# Patient Record
Sex: FEMALE | Race: WHITE | NOT HISPANIC OR LATINO | Employment: FULL TIME | URBAN - METROPOLITAN AREA
[De-identification: names, ages, dates, MRNs, and addresses within clinical notes are randomized per-mention and may not be internally consistent; named-entity substitution may affect disease eponyms.]

---

## 2018-10-29 ENCOUNTER — OFFICE VISIT - HEALTHEAST (OUTPATIENT)
Dept: FAMILY MEDICINE | Facility: CLINIC | Age: 28
End: 2018-10-29

## 2018-10-29 DIAGNOSIS — Z00.00 ROUTINE GENERAL MEDICAL EXAMINATION AT A HEALTH CARE FACILITY: ICD-10-CM

## 2018-10-29 DIAGNOSIS — Z86.19 HISTORY OF HERPES GENITALIS: ICD-10-CM

## 2018-10-29 DIAGNOSIS — J30.9 ALLERGIC RHINITIS: ICD-10-CM

## 2018-10-29 DIAGNOSIS — F41.1 GAD (GENERALIZED ANXIETY DISORDER): ICD-10-CM

## 2018-10-29 DIAGNOSIS — Z12.4 SCREENING FOR CERVICAL CANCER: ICD-10-CM

## 2018-10-29 ASSESSMENT — MIFFLIN-ST. JEOR: SCORE: 1299.77

## 2018-10-30 LAB
HPV SOURCE: NORMAL
HUMAN PAPILLOMA VIRUS 16 DNA: NEGATIVE
HUMAN PAPILLOMA VIRUS 18 DNA: NEGATIVE
HUMAN PAPILLOMA VIRUS FINAL DIAGNOSIS: NORMAL
HUMAN PAPILLOMA VIRUS OTHER HR: NEGATIVE
SPECIMEN DESCRIPTION: NORMAL

## 2018-11-05 ENCOUNTER — COMMUNICATION - HEALTHEAST (OUTPATIENT)
Dept: FAMILY MEDICINE | Facility: CLINIC | Age: 28
End: 2018-11-05

## 2018-11-05 LAB
BKR LAB AP ABNORMAL BLEEDING: NO
BKR LAB AP BIRTH CONTROL/HORMONES: NORMAL
BKR LAB AP CERVICAL APPEARANCE: NORMAL
BKR LAB AP GYN ADEQUACY: NORMAL
BKR LAB AP GYN INTERPRETATION: NORMAL
BKR LAB AP HPV REFLEX: NORMAL
BKR LAB AP LMP: NORMAL
BKR LAB AP PATIENT STATUS: NORMAL
BKR LAB AP PREVIOUS ABNORMAL: NORMAL
BKR LAB AP PREVIOUS NORMAL: 2015
HIGH RISK?: NO
PATH REPORT.COMMENTS IMP SPEC: NORMAL
RESULT FLAG (HE HISTORICAL CONVERSION): NORMAL

## 2018-11-07 ENCOUNTER — COMMUNICATION - HEALTHEAST (OUTPATIENT)
Dept: FAMILY MEDICINE | Facility: CLINIC | Age: 28
End: 2018-11-07

## 2018-11-07 DIAGNOSIS — F41.1 GAD (GENERALIZED ANXIETY DISORDER): ICD-10-CM

## 2018-11-07 DIAGNOSIS — M25.50 MULTIPLE JOINT PAIN: ICD-10-CM

## 2018-11-12 ENCOUNTER — OFFICE VISIT - HEALTHEAST (OUTPATIENT)
Dept: BEHAVIORAL HEALTH | Facility: CLINIC | Age: 28
End: 2018-11-12

## 2018-11-12 DIAGNOSIS — F41.1 GAD (GENERALIZED ANXIETY DISORDER): ICD-10-CM

## 2018-11-19 ENCOUNTER — AMBULATORY - HEALTHEAST (OUTPATIENT)
Dept: LAB | Facility: CLINIC | Age: 28
End: 2018-11-19

## 2018-11-19 ENCOUNTER — OFFICE VISIT - HEALTHEAST (OUTPATIENT)
Dept: BEHAVIORAL HEALTH | Facility: CLINIC | Age: 28
End: 2018-11-19

## 2018-11-19 DIAGNOSIS — M25.50 MULTIPLE JOINT PAIN: ICD-10-CM

## 2018-11-19 DIAGNOSIS — F41.1 GAD (GENERALIZED ANXIETY DISORDER): ICD-10-CM

## 2018-11-20 LAB — B BURGDOR IGG+IGM SER QL: 0.06 INDEX VALUE

## 2019-01-26 ENCOUNTER — COMMUNICATION - HEALTHEAST (OUTPATIENT)
Dept: FAMILY MEDICINE | Facility: CLINIC | Age: 29
End: 2019-01-26

## 2019-01-26 DIAGNOSIS — J30.9 ALLERGIC RHINITIS: ICD-10-CM

## 2019-01-26 DIAGNOSIS — F41.1 GAD (GENERALIZED ANXIETY DISORDER): ICD-10-CM

## 2019-02-07 ENCOUNTER — COMMUNICATION - HEALTHEAST (OUTPATIENT)
Dept: FAMILY MEDICINE | Facility: CLINIC | Age: 29
End: 2019-02-07

## 2019-02-07 DIAGNOSIS — A60.00 GENITAL HERPES SIMPLEX, UNSPECIFIED SITE: ICD-10-CM

## 2019-11-07 ENCOUNTER — OFFICE VISIT - HEALTHEAST (OUTPATIENT)
Dept: FAMILY MEDICINE | Facility: CLINIC | Age: 29
End: 2019-11-07

## 2019-11-07 DIAGNOSIS — Z11.3 SCREEN FOR STD (SEXUALLY TRANSMITTED DISEASE): ICD-10-CM

## 2019-11-07 DIAGNOSIS — N90.7 VULVAR CYST: ICD-10-CM

## 2019-11-07 DIAGNOSIS — Z13.21 ENCOUNTER FOR VITAMIN DEFICIENCY SCREENING: ICD-10-CM

## 2019-11-07 DIAGNOSIS — Z13.0 SCREENING FOR DEFICIENCY ANEMIA: ICD-10-CM

## 2019-11-07 DIAGNOSIS — Z13.1 SCREENING FOR DIABETES MELLITUS: ICD-10-CM

## 2019-11-07 DIAGNOSIS — Z13.220 SCREENING FOR CHOLESTEROL LEVEL: ICD-10-CM

## 2019-11-07 DIAGNOSIS — Z00.00 ROUTINE GENERAL MEDICAL EXAMINATION AT A HEALTH CARE FACILITY: ICD-10-CM

## 2019-11-07 DIAGNOSIS — R53.83 LOW ENERGY: ICD-10-CM

## 2019-11-07 DIAGNOSIS — Z86.19 HISTORY OF HERPES GENITALIS: ICD-10-CM

## 2019-11-07 DIAGNOSIS — F41.1 GAD (GENERALIZED ANXIETY DISORDER): ICD-10-CM

## 2019-11-07 DIAGNOSIS — R20.8 BURNING SENSATION OF MOUTH: ICD-10-CM

## 2019-11-07 LAB
ALBUMIN SERPL-MCNC: 4.3 G/DL (ref 3.5–5)
ALP SERPL-CCNC: 36 U/L (ref 45–120)
ALT SERPL W P-5'-P-CCNC: 14 U/L (ref 0–45)
ANION GAP SERPL CALCULATED.3IONS-SCNC: 9 MMOL/L (ref 5–18)
AST SERPL W P-5'-P-CCNC: 18 U/L (ref 0–40)
BILIRUB SERPL-MCNC: 0.7 MG/DL (ref 0–1)
BUN SERPL-MCNC: 10 MG/DL (ref 8–22)
CALCIUM SERPL-MCNC: 9.3 MG/DL (ref 8.5–10.5)
CHLORIDE BLD-SCNC: 106 MMOL/L (ref 98–107)
CHOLEST SERPL-MCNC: 196 MG/DL
CO2 SERPL-SCNC: 24 MMOL/L (ref 22–31)
CREAT SERPL-MCNC: 0.78 MG/DL (ref 0.6–1.1)
ERYTHROCYTE [DISTWIDTH] IN BLOOD BY AUTOMATED COUNT: 11.7 % (ref 11–14.5)
FASTING STATUS PATIENT QL REPORTED: YES
FERRITIN SERPL-MCNC: 38 NG/ML (ref 10–130)
FOLATE SERPL-MCNC: 13 NG/ML
GFR SERPL CREATININE-BSD FRML MDRD: >60 ML/MIN/1.73M2
GLUCOSE BLD-MCNC: 84 MG/DL (ref 70–125)
HCT VFR BLD AUTO: 42.4 % (ref 35–47)
HDLC SERPL-MCNC: 84 MG/DL
HGB BLD-MCNC: 14 G/DL (ref 12–16)
HIV 1+2 AB+HIV1 P24 AG SERPL QL IA: NEGATIVE
LDLC SERPL CALC-MCNC: 99 MG/DL
MCH RBC QN AUTO: 31.3 PG (ref 27–34)
MCHC RBC AUTO-ENTMCNC: 33 G/DL (ref 32–36)
MCV RBC AUTO: 95 FL (ref 80–100)
PLATELET # BLD AUTO: 258 THOU/UL (ref 140–440)
PMV BLD AUTO: 6.7 FL (ref 7–10)
POTASSIUM BLD-SCNC: 3.7 MMOL/L (ref 3.5–5)
PROT SERPL-MCNC: 6.8 G/DL (ref 6–8)
RBC # BLD AUTO: 4.47 MILL/UL (ref 3.8–5.4)
SODIUM SERPL-SCNC: 139 MMOL/L (ref 136–145)
TRIGL SERPL-MCNC: 63 MG/DL
TSH SERPL DL<=0.005 MIU/L-ACNC: 1.05 UIU/ML (ref 0.3–5)
VIT B12 SERPL-MCNC: 293 PG/ML (ref 213–816)
WBC: 5.4 THOU/UL (ref 4–11)

## 2019-11-07 RX ORDER — CETIRIZINE HYDROCHLORIDE 10 MG/1
10 TABLET, CHEWABLE ORAL DAILY
Status: SHIPPED | COMMUNITY
Start: 2019-11-07 | End: 2021-08-18

## 2019-11-07 ASSESSMENT — ANXIETY QUESTIONNAIRES
4. TROUBLE RELAXING: MORE THAN HALF THE DAYS
6. BECOMING EASILY ANNOYED OR IRRITABLE: SEVERAL DAYS
5. BEING SO RESTLESS THAT IT IS HARD TO SIT STILL: SEVERAL DAYS
GAD7 TOTAL SCORE: 14
3. WORRYING TOO MUCH ABOUT DIFFERENT THINGS: NEARLY EVERY DAY
2. NOT BEING ABLE TO STOP OR CONTROL WORRYING: MORE THAN HALF THE DAYS
1. FEELING NERVOUS, ANXIOUS, OR ON EDGE: NEARLY EVERY DAY
7. FEELING AFRAID AS IF SOMETHING AWFUL MIGHT HAPPEN: MORE THAN HALF THE DAYS
IF YOU CHECKED OFF ANY PROBLEMS ON THIS QUESTIONNAIRE, HOW DIFFICULT HAVE THESE PROBLEMS MADE IT FOR YOU TO DO YOUR WORK, TAKE CARE OF THINGS AT HOME, OR GET ALONG WITH OTHER PEOPLE: SOMEWHAT DIFFICULT

## 2019-11-07 ASSESSMENT — MIFFLIN-ST. JEOR: SCORE: 1294.1

## 2019-11-07 ASSESSMENT — PATIENT HEALTH QUESTIONNAIRE - PHQ9: SUM OF ALL RESPONSES TO PHQ QUESTIONS 1-9: 1

## 2019-11-08 LAB
25(OH)D3 SERPL-MCNC: 22.9 NG/ML (ref 30–80)
25(OH)D3 SERPL-MCNC: 22.9 NG/ML (ref 30–80)
C TRACH DNA SPEC QL PROBE+SIG AMP: NEGATIVE
HAV IGM SERPL QL IA: NEGATIVE
HBV CORE IGM SERPL QL IA: NEGATIVE
HBV SURFACE AG SERPL QL IA: NEGATIVE
HCV AB SERPL QL IA: NEGATIVE
N GONORRHOEA DNA SPEC QL NAA+PROBE: NEGATIVE
T PALLIDUM AB SER QL: NEGATIVE

## 2019-11-09 LAB — ZINC SERPL-MCNC: 68.1 UG/DL (ref 60–120)

## 2019-11-11 LAB
PYRIDOXAL PHOS SERPL-SCNC: 22.3 NMOL/L (ref 20–125)
VIT C SERPL-MCNC: 18 UMOL/L (ref 23–114)

## 2019-11-19 ENCOUNTER — COMMUNICATION - HEALTHEAST (OUTPATIENT)
Dept: FAMILY MEDICINE | Facility: CLINIC | Age: 29
End: 2019-11-19

## 2019-11-19 DIAGNOSIS — Z30.011 ENCOUNTER FOR INITIAL PRESCRIPTION OF CONTRACEPTIVE PILLS: ICD-10-CM

## 2019-11-22 ENCOUNTER — COMMUNICATION - HEALTHEAST (OUTPATIENT)
Dept: FAMILY MEDICINE | Facility: CLINIC | Age: 29
End: 2019-11-22

## 2020-01-11 ENCOUNTER — COMMUNICATION - HEALTHEAST (OUTPATIENT)
Dept: FAMILY MEDICINE | Facility: CLINIC | Age: 30
End: 2020-01-11

## 2020-03-20 ENCOUNTER — COMMUNICATION - HEALTHEAST (OUTPATIENT)
Dept: FAMILY MEDICINE | Facility: CLINIC | Age: 30
End: 2020-03-20

## 2020-03-30 ENCOUNTER — COMMUNICATION - HEALTHEAST (OUTPATIENT)
Dept: FAMILY MEDICINE | Facility: CLINIC | Age: 30
End: 2020-03-30

## 2020-03-30 DIAGNOSIS — J30.9 ALLERGIC RHINITIS: ICD-10-CM

## 2020-05-19 ENCOUNTER — COMMUNICATION - HEALTHEAST (OUTPATIENT)
Dept: FAMILY MEDICINE | Facility: CLINIC | Age: 30
End: 2020-05-19

## 2020-05-20 ENCOUNTER — COMMUNICATION - HEALTHEAST (OUTPATIENT)
Dept: FAMILY MEDICINE | Facility: CLINIC | Age: 30
End: 2020-05-20

## 2020-05-20 DIAGNOSIS — R20.8 BURNING SENSATION OF MOUTH: ICD-10-CM

## 2020-06-01 ENCOUNTER — OFFICE VISIT - HEALTHEAST (OUTPATIENT)
Dept: FAMILY MEDICINE | Facility: CLINIC | Age: 30
End: 2020-06-01

## 2020-06-01 DIAGNOSIS — N39.0 ACUTE UTI (URINARY TRACT INFECTION): ICD-10-CM

## 2020-06-02 ENCOUNTER — COMMUNICATION - HEALTHEAST (OUTPATIENT)
Dept: FAMILY MEDICINE | Facility: CLINIC | Age: 30
End: 2020-06-02

## 2020-06-02 DIAGNOSIS — N39.0 URINARY TRACT INFECTION WITHOUT HEMATURIA, SITE UNSPECIFIED: ICD-10-CM

## 2020-06-18 ENCOUNTER — COMMUNICATION - HEALTHEAST (OUTPATIENT)
Dept: FAMILY MEDICINE | Facility: CLINIC | Age: 30
End: 2020-06-18

## 2020-07-07 ENCOUNTER — COMMUNICATION - HEALTHEAST (OUTPATIENT)
Dept: FAMILY MEDICINE | Facility: CLINIC | Age: 30
End: 2020-07-07

## 2020-07-08 ENCOUNTER — OFFICE VISIT - HEALTHEAST (OUTPATIENT)
Dept: FAMILY MEDICINE | Facility: CLINIC | Age: 30
End: 2020-07-08

## 2020-07-08 DIAGNOSIS — R21 RASH AND NONSPECIFIC SKIN ERUPTION: ICD-10-CM

## 2020-07-08 DIAGNOSIS — L30.9 DERMATITIS: ICD-10-CM

## 2020-09-09 ENCOUNTER — COMMUNICATION - HEALTHEAST (OUTPATIENT)
Dept: FAMILY MEDICINE | Facility: CLINIC | Age: 30
End: 2020-09-09

## 2020-09-10 ENCOUNTER — COMMUNICATION - HEALTHEAST (OUTPATIENT)
Dept: FAMILY MEDICINE | Facility: CLINIC | Age: 30
End: 2020-09-10

## 2020-09-10 DIAGNOSIS — Z30.011 ENCOUNTER FOR INITIAL PRESCRIPTION OF CONTRACEPTIVE PILLS: ICD-10-CM

## 2020-09-11 ENCOUNTER — COMMUNICATION - HEALTHEAST (OUTPATIENT)
Dept: FAMILY MEDICINE | Facility: CLINIC | Age: 30
End: 2020-09-11

## 2020-09-11 DIAGNOSIS — Z30.011 ENCOUNTER FOR INITIAL PRESCRIPTION OF CONTRACEPTIVE PILLS: ICD-10-CM

## 2020-09-30 ENCOUNTER — RECORDS - HEALTHEAST (OUTPATIENT)
Dept: ADMINISTRATIVE | Facility: OTHER | Age: 30
End: 2020-09-30

## 2020-10-26 ENCOUNTER — VIRTUAL VISIT (OUTPATIENT)
Dept: FAMILY MEDICINE | Facility: OTHER | Age: 30
End: 2020-10-26

## 2020-10-26 NOTE — PROGRESS NOTES
"Date: 10/26/2020 12:12:30  Clinician: Hung Ordaz  Clinician NPI: [id number]  Patient: Harley Quarles  Patient : 1990  Patient Address: [address] [place] [address]  Patient Phone: [phone]  Visit Protocol: URI  Patient Summary:  Harley is a 29 year old ( : 1990 ) female who initiated a [facility] Visit for COVID-19 (Coronavirus) evaluation and screening. When asked the question \"Please sign me up to receive news, health information and promotions. \", Harley responded \"No\".    Harley states her symptoms started 1-2 days ago.   Her symptoms consist of ear pain, a headache, a cough, nasal congestion, anosmia, rhinitis, nausea, malaise, ageusia, and diarrhea. She is experiencing difficulty breathing due to nasal congestion but she is not short of breath.   Symptom details     Nasal secretions: The color of her mucus is clear.    Cough: Harley coughs a few times an hour and her cough is not more bothersome at night. Phlegm does not come into her throat when she coughs. She believes her cough is caused by post-nasal drip.     Headache: She states the headache is mild (1-3 on a 10 point pain scale).      Harley denies having wheezing, fever, vomiting, facial pain or pressure, myalgias, chills, sore throat, and teeth pain. She also denies taking antibiotic medication in the past month and having recent facial or sinus surgery in the past 60 days.   Precipitating events  She has not recently been exposed to someone with influenza. Harley has not been in close contact with any high risk individuals.   Pertinent COVID-19 (Coronavirus) information  In the past 14 days, Harley has not worked in a congregate living setting.   She does not work or volunteer as healthcare worker or a  and does not work or volunteer in a healthcare facility.   Harley also has not lived in a congregate living setting in the past 14 days. She does not live with a healthcare worker.   Harley has had a close contact " with a laboratory-confirmed COVID-19 patient within 14 days of symptom onset. Additional information about contact with COVID-19 (Coronavirus) patient as reported by the patient (free text): I was at a wedding this weekend with a lot of people who weren't wearing masks. I'm concerned I have been exposed and would like to be tested.   Since December 2019, Harley and has not had upper respiratory infection or influenza-like illness. Has not been diagnosed with lab-confirmed COVID-19 test   Pertinent medical history  Harley does not get yeast infections when she takes antibiotics.   Harley needs a return to work/school note.   Weight: 115 lbs   Harley does not smoke or use smokeless tobacco.   She denies pregnancy and denies breastfeeding. She has menstruated in the past month.   Weight: 115 lbs    MEDICATIONS: Apri oral, ALLERGIES: Penicillins  Clinician Response:  Dear Harley,   Your symptoms show that you may have coronavirus (COVID-19). This illness can cause fever, cough and trouble breathing. Many people get a mild case and get better on their own. Some people can get very sick.  What should I do?  We would like to test you for this virus.   1. Please call [phone] to schedule your visit. Explain that you were [facility] to have a COVID-19 test. Be ready to share your [facility] visit ID number.  Please note that if you are assessed for Covid-19 testing and receive an order for testing from [facility], that the scheduling of your Covid test at [facility] may be delayed by three or four days or more due to limited availability for testing. Additional options for testing can be found on the [place] Covid-19 Response website. https://[place].gov/covid19/    The following will serve as your written order for this COVID Test, ordered by me, for the indication of suspected COVID [Z20.828]: The test will be ordered in [employer], our electronic health record, after you are scheduled. It will show as ordered and authorized  "by Star Greenwood MD.  Order: COVID-19 (Coronavirus) PCR for SYMPTOMATIC testing from [facility].   2. When it's time for your COVID test:  Stay at least 6 feet away from others. (If someone will drive you to your test, stay in the backseat, as far away from the  as you can.)   Cover your mouth and nose with a mask, tissue or washcloth.  Go straight to the testing site. Don't make any stops on the way there or back.      3.Starting now: Stay home and away from others (self-isolate) until:   You've had no fever---and no medicine that reduces fever---for one full day (24 hours). And...   Your other symptoms have gotten better. For example, your cough or breathing has improved. And...   At least 10 days have passed since your symptoms started.       During this time, don't leave the house except for testing or medical care.   Stay in your own room, even for meals. Use your own bathroom if you can.   Stay away from others in your home. No hugging, kissing or shaking hands. No visitors.  Don't go to work, school or anywhere else.    Clean \"high touch\" surfaces often (doorknobs, counters, handles, etc.). Use a household cleaning spray or wipes. You'll find a full list of  on the EPA website: www.epa.gov/pesticide-registration/list-n-disinfectants-use-against-sars-cov-2.   Cover your mouth and nose with a mask, tissue or washcloth to avoid spreading germs.  Wash your hands and face often. Use soap and water.  Caregivers in these groups are at risk for severe illness due to COVID-19:  o People 65 years and older  o People who live in a nursing home or long-term care facility  o People with chronic disease (lung, heart, cancer, diabetes, kidney, liver, immunologic)  o People who have a weakened immune system, including those who:   Are in cancer treatment  Take medicine that weakens the immune system, such as corticosteroids  Had a bone marrow or organ transplant  Have an immune deficiency  Have poorly controlled HIV " or AIDS  Are obese (body mass index of 40 or higher)  Smoke regularly   o Caregivers should wear gloves while washing dishes, handling laundry and cleaning bedrooms and bathrooms.  o Use caution when washing and drying laundry: Don't shake dirty laundry, and use the warmest water setting that you can.  o For more tips, go to www.cdc.gov/coronavirus/2019-ncov/downloads/10Things.pdf.       How can I take care of myself?   Get lots of rest. Drink extra fluids (unless a doctor has told you not to).   Take Tylenol (acetaminophen) for fever or pain. If you have liver or kidney problems, ask your family doctor if it's okay to take Tylenol.   Adults can take either:    650 mg (two 325 mg pills) every 4 to 6 hours, or...   1,000 mg (two 500 mg pills) every 8 hours as needed.    Note: Don't take more than 3,000 mg in one day. Acetaminophen is found in many medicines (both prescribed and over-the-counter medicines). Read all labels to be sure you don't take too much.   For children, check the Tylenol bottle for the right dose. The dose is based on the child's age or weight.    If you have other health problems (like cancer, heart failure, an organ transplant or severe kidney disease): Call your specialty clinic if you don't feel better in the next 2 days.       Know when to call 911. Emergency warning signs include:    Trouble breathing or shortness of breath Pain or pressure in the chest that doesn't go away Feeling confused like you haven't felt before, or not being able to wake up Bluish-colored lips or face.  Where can I get more information?    [employer] -- About COVID-19: [email or web address]   CDC -- What to Do If You're Sick: www.cdc.gov/coronavirus/2019-ncov/about/steps-when-sick.html   CDC -- Ending Home Isolation: www.cdc.gov/coronavirus/2019-ncov/hcp/disposition-in-home-patients.html   CDC -- Caring for Someone: www.cdc.gov/coronavirus/2019-ncov/if-you-are-sick/care-for-someone.html   [facility] --  Interim Guidance for Hospital Discharge to Home: www.health.[facility].[place]/diseases/coronavirus/hcp/hospdischarge.pdf   [facility] clinical trials (COVID-19 research studies): clinicalaffairs.[facility]/umn-clinical-trials    Below are the COVID-19 hotlines at the [place] Department of Health ([facility]). Interpreters are available.    For health questions: Call 640-629-0957 or 1-462.241.1705 (7 a.m. to 7 p.m.) For questions about schools and childcare: Call 384-265-2132 or 1-328.561.7219 (7 a.m. to 7 p.m.)    Diagnosis: Contact with and (suspected) exposure to other viral communicable diseases  Diagnosis ICD: Z20.828

## 2020-11-09 ENCOUNTER — OFFICE VISIT - HEALTHEAST (OUTPATIENT)
Dept: FAMILY MEDICINE | Facility: CLINIC | Age: 30
End: 2020-11-09

## 2020-11-09 DIAGNOSIS — F41.0 PANIC ATTACK: ICD-10-CM

## 2020-11-09 DIAGNOSIS — F41.1 GAD (GENERALIZED ANXIETY DISORDER): ICD-10-CM

## 2020-11-09 DIAGNOSIS — J30.89 SEASONAL ALLERGIC RHINITIS DUE TO OTHER ALLERGIC TRIGGER: ICD-10-CM

## 2020-11-09 DIAGNOSIS — Z30.011 ENCOUNTER FOR INITIAL PRESCRIPTION OF CONTRACEPTIVE PILLS: ICD-10-CM

## 2020-11-09 DIAGNOSIS — Z00.00 ROUTINE GENERAL MEDICAL EXAMINATION AT A HEALTH CARE FACILITY: ICD-10-CM

## 2020-11-09 RX ORDER — LORAZEPAM 0.5 MG/1
0.5 TABLET ORAL EVERY 6 HOURS PRN
Qty: 30 TABLET | Refills: 0 | Status: SHIPPED | OUTPATIENT
Start: 2020-11-09 | End: 2022-01-17

## 2020-11-09 RX ORDER — DESOGESTREL AND ETHINYL ESTRADIOL 0.15-0.03
1 KIT ORAL DAILY
Qty: 3 PACKAGE | Refills: 3 | Status: SHIPPED | OUTPATIENT
Start: 2020-11-09 | End: 2021-10-26

## 2020-11-09 ASSESSMENT — MIFFLIN-ST. JEOR: SCORE: 1315.08

## 2020-11-13 ENCOUNTER — COMMUNICATION - HEALTHEAST (OUTPATIENT)
Dept: FAMILY MEDICINE | Facility: CLINIC | Age: 30
End: 2020-11-13

## 2020-11-13 DIAGNOSIS — J30.89 SEASONAL ALLERGIC RHINITIS DUE TO OTHER ALLERGIC TRIGGER: ICD-10-CM

## 2020-11-15 RX ORDER — AZELASTINE 1 MG/ML
SPRAY, METERED NASAL
Qty: 30 ML | Refills: 12 | Status: SHIPPED | OUTPATIENT
Start: 2020-11-15 | End: 2021-08-18

## 2020-11-17 ENCOUNTER — COMMUNICATION - HEALTHEAST (OUTPATIENT)
Dept: FAMILY MEDICINE | Facility: CLINIC | Age: 30
End: 2020-11-17

## 2020-12-01 ENCOUNTER — COMMUNICATION - HEALTHEAST (OUTPATIENT)
Dept: FAMILY MEDICINE | Facility: CLINIC | Age: 30
End: 2020-12-01

## 2020-12-29 ENCOUNTER — OFFICE VISIT - HEALTHEAST (OUTPATIENT)
Dept: URGENT CARE | Age: 30
End: 2020-12-29

## 2020-12-29 DIAGNOSIS — J01.90 ACUTE SINUSITIS WITH SYMPTOMS > 10 DAYS: ICD-10-CM

## 2021-03-29 ENCOUNTER — COMMUNICATION - HEALTHEAST (OUTPATIENT)
Dept: FAMILY MEDICINE | Facility: CLINIC | Age: 31
End: 2021-03-29

## 2021-03-29 DIAGNOSIS — J30.9 ALLERGIC RHINITIS: ICD-10-CM

## 2021-05-26 ASSESSMENT — PATIENT HEALTH QUESTIONNAIRE - PHQ9: SUM OF ALL RESPONSES TO PHQ QUESTIONS 1-9: 1

## 2021-05-28 ASSESSMENT — ANXIETY QUESTIONNAIRES: GAD7 TOTAL SCORE: 14

## 2021-06-02 VITALS — BODY MASS INDEX: 17.88 KG/M2 | WEIGHT: 118 LBS | HEIGHT: 68 IN

## 2021-06-03 VITALS
SYSTOLIC BLOOD PRESSURE: 98 MMHG | WEIGHT: 116.75 LBS | OXYGEN SATURATION: 100 % | BODY MASS INDEX: 17.7 KG/M2 | DIASTOLIC BLOOD PRESSURE: 60 MMHG | HEART RATE: 72 BPM | HEIGHT: 68 IN

## 2021-06-03 NOTE — PROGRESS NOTES
FEMALE PREVENTIVE EXAM    Assessment & Plan   1. Routine general medical examination at a health care facility  Fasting labs, not due for pap. Flu shot    2. ABI (generalized anxiety disorder)  Continues to struggle with generalized anxiety though not as severe as previously.  Negative thought process.  Would like to avoid medication if possible and we discussed that with her level of anxiety it is very reasonable to think she may reach a good point of remission with therapy alone.  She is open to trying another therapist and will set her up with a referral today.  Follow up if anxiety worsens.    - Ambulatory referral to Psychology    3. History of herpes genitalis  Infrequent outbreaks.  Discussed history and risk of transmission, treatment options.  Continue to use Valtrex prn as she has very infrequent outbreaks.   - valACYclovir (VALTREX) 500 MG tablet; Take 1 tablet (500 mg total) by mouth 2 (two) times a day for 3 days.  Dispense: 6 tablet; Refill: 3    4. Low energy  Check labs.  Counseled on importance of healthy diet and exercise.   - HM2(CBC w/o Differential)  - Ferritin  - Vitamin B12  - Vitamin D, Total (25-Hydroxy)  - Thyroid Cascade  - Comprehensive Metabolic Panel    5. Burning sensation of mouth  No visible lesions or abnormalities.  Check vitamin levels  - Zinc, Serum or Plasma  - Vitamin C (Ascorbic Acid), Plasma  - Folate, Serum  - Vitamin B6 (Pyridoxal 5-Phosphate)    6. Vulvar cyst  Reassured this is consistent with a cyst. May resolve on its own, can use hot compresses or warm tub soaks.  If it becomes red or enlarges in size, return for recheck     7. Screening for cholesterol level  - Lipid Cascade    8. Screening for diabetes mellitus  - Comprehensive Metabolic Panel    9. Screening for deficiency anemia    10. Encounter for vitamin deficiency screening  - Vitamin B12  - Vitamin D, Total (25-Hydroxy)    11. Screen for STD (sexually transmitted disease)  - HIV Antigen/Antibody Screening  Cascade  - Syphilis Screen, Cascade  - Hepatitis Acute Evaluation  - Chlamydia trachomatis & Neisseria gonorrhoeae, Amplified Detection    Recommend repeat pap smear if normal every three years, Recommended adequate calcium intake/osteoporosis prevention, Discussed breast cancer screening guidelines, Discussed safe sex practices and Discussed diet, including moderation of portions sizes, avoiding eating out and fast food and increase in fruits and vegetables    Shazia Caban, BISI    Subjective:   Chief Complaint:  No chief complaint on file.    HPI:  Harley Quarles is a 29 y.o. female who presents for routine physical exam.  She has been well.  She is working as an  in a communal office space    Anxiety: History of ABI for many years.  Was quite uncontrolled when she was seen one year ago.  Prescribed Lexapro and referred to therapist.  She felt she did not connect with this person so ultimately stopped going. Did not take Lexapro as she was too worried about side effects. She feels anxiety has become more manageable.  Still present on a daily basis.  States most of her thoughts are still negative and she struggles with low self esteem.  She is interested in establishing with a therapist again.    H/o HSV:  Valtrex prn for outbreaks.  Recently told a new partner and having trouble overcoming the stigma    Fatigue:  For several months.  Just feels low energy. Poor diet. High in carbohydrates, very little fruits and vegetables. Requests blood work to check iron levels. Did use some iron supplements for one week and felt better.  No fever, weight loss, night sweats.  Not exercising at all.     Burning/tingling sensation under tongue and along gum line. Has not seen any sores. No known injury or trauma. Teeth in good repair.     Cyst:  Left vulva.  Tender only with pressure.  No drainage.  Would like confirmation it is a cyst.     OB/Gyn History:  Menstrual history: regular periods  Date of previous  pap: 2018  History of abnormal pap: none  Current Contraceptive method: condoms    Preventive Health:  Reviewed and recommended screening and treatment recommendations:  Mammography: no FMH  Immunizations: flu shot    Health Habits:    Exercise: no.  Calcium intake/Osteoporosis prevention: no    PMH:   Patient Active Problem List   Diagnosis     ABI (generalized anxiety disorder)     History of herpes genitalis     Allergic rhinitis       No past medical history on file.    Current Medications: Reviewed   Current Outpatient Medications on File Prior to Visit   Medication Sig Dispense Refill     escitalopram oxalate (LEXAPRO) 10 MG tablet TAKE 1 TABLET BY MOUTH EVERY DAY 30 tablet 9     fluticasone (FLONASE) 50 mcg/actuation nasal spray INSTILL 1 SPRAY INTO EACH NOSTRIL TWICE A DAY 48 g 3     loratadine (CLARITIN) 10 mg tablet Take 10 mg by mouth daily.       multivitamin therapeutic tablet Take 1 tablet by mouth daily.       No current facility-administered medications on file prior to visit.        Allergies:  Reviewed  is allergic to amoxicillin.    Social History:  Social History     Occupational History     Not on file   Tobacco Use     Smoking status: Never Smoker     Smokeless tobacco: Never Used   Substance and Sexual Activity     Alcohol use: Yes     Drug use: No     Sexual activity: Not on file       Family History:   Family History   Problem Relation Age of Onset     Cancer Maternal Grandfather      Mental illness Paternal Grandfather      Prostate cancer Paternal Grandfather          Review of Systems:  Complete head to toe review of systems is otherwise negative except as above.    Objective:    There were no vitals taken for this visit.    GENERAL: Alert, well-appearing female .   PSYCH: Pleasant mood, affect appropriate.  Appears anxious  SKIN: No atypical lesions  EYES: Conjunctiva pink, sclera white, no exudates. ALEX.  EOMs intact.   EARS: TMs pearly grey, no bulging, redness, retraction.   MOUTH:  Pharynx moist, pink without exudate. No tonsillar enlargement  NECK: No lymphadenopathy. Thyroid borders smooth without enlargement, nodules.   CV: Regular rate and rhythm without murmurs, rubs or gallops.  RESP: Lung sounds clear  ABDOMEN: BS+. Abdomen soft.  No organomegaly  BREASTS: Breasts symmetric, no dimpling, masses or skin discolorations seen. Areolas and nipples symmetric without discharge. On palpation, breast tissue supple and nontender. No masses or nodules. Axillary and epitrochlear lymph nodes nonpalpable.    FEMALE:  0.5cm x 0.5cm soft, fluctuant superficial lesion of left external labia.  Mildly tender to palpation.   PV :  No edema

## 2021-06-05 VITALS
SYSTOLIC BLOOD PRESSURE: 96 MMHG | WEIGHT: 120.5 LBS | HEIGHT: 68 IN | TEMPERATURE: 98.2 F | DIASTOLIC BLOOD PRESSURE: 70 MMHG | RESPIRATION RATE: 16 BRPM | OXYGEN SATURATION: 98 % | HEART RATE: 79 BPM | BODY MASS INDEX: 18.26 KG/M2

## 2021-06-07 NOTE — TELEPHONE ENCOUNTER
Refill Approved    Rx renewed per Medication Renewal Policy. Medication was last renewed on 1/28/19.    Desirae Suazo, [facility] Connection Triage/Med Refill 3/31/2020     Requested Prescriptions   Pending Prescriptions Disp Refills     fluticasone propionate (FLONASE) 50 mcg/actuation nasal spray [Pharmacy Med Name: FLUTICASONE PROP 50 MCG SPRAY] 16 g 11     Sig: INSTILL 1 SPRAY INTO EACH NOSTRIL TWICE A DAY       Nasal Steroid Refill Protocol Passed - 3/30/2020 11:31 AM        Passed - Patient has had office visit/physical in last 2 years     Last office visit with prescriber/PCP: Visit date not found OR same dept: Visit date not found OR same specialty: Visit date not found Last physical: 11/7/2019 Last MTM visit: Visit date not found    Next appt within 3 mo: Visit date not found  Next physical within 3 mo: Visit date not found  Prescriber OR PCP: Shazia Caban CNP  Last diagnosis associated with med order: 1. Allergic rhinitis  - fluticasone propionate (FLONASE) 50 mcg/actuation nasal spray [Pharmacy Med Name: FLUTICASONE PROP 50 MCG SPRAY]; INSTILL 1 SPRAY INTO EACH NOSTRIL TWICE A DAY  Dispense: 16 g; Refill: 11     If protocol passes may refill for 12 months if within 3 months of last provider visit (or a total of 15 months).

## 2021-06-08 NOTE — PROGRESS NOTES
Provider E-Visit time total (minutes): 10    Following note to patient:    Carlos Souza,    I reviewed your message and agree that you likely have a urinary tract infection.  I would not chose ampicillin to treat this - it does not have great coverage for UTI pathogens.  I'd like to send a prescription for Macrobid, but [facility] [place] is [facility] - can I send it to the [facility] close by instead (on [facility])?    Also just a caution that without a urine sample , we are just guessing. I understand that you have had UTIs before.  sexually transmitted infections are always a possibility I ask people to keep in mind as well, especially if the antibiotic doesn't clear you infection.    Alisha Johnson MD

## 2021-06-08 NOTE — TELEPHONE ENCOUNTER
Medication: valACYclovir (VALTREX) 500 MG tablet   Pharmacy: [facility]   Last OV: 11/07/2020  Last Refill: 11/07/2019  Q:6  Refills: 3    Patient sent a [employer] message requesting a refill. Will forward this to the on call provider as PCP is on maternity leave.     DENIA/KAREN

## 2021-06-09 NOTE — PROGRESS NOTES
"Harley Quarles is a 29 y.o. female who is being evaluated via a billable telephone visit.      The patient has been notified of following:     \"This telephone visit will be conducted via a call between you and your physician/provider. We have found that certain health care needs can be provided without the need for a physical exam.  This service lets us provide the care you need with a short phone conversation.  If a prescription is necessary we can send it directly to your pharmacy.  If lab work is needed we can place an order for that and you can then stop by our lab to have the test done at a later time.    Telephone visits are billed at different rates depending on your insurance coverage. During this emergency period, for some insurers they may be billed the same as an in-person visit.  Please reach out to your insurance provider with any questions.    If during the course of the call the physician/provider feels a telephone visit is not appropriate, you will not be charged for this service.\"    Patient has given verbal consent to a Telephone visit? Yes    What phone number would you like to be contacted at? [phone]    Patient would like to receive their AVS by AVS Preference: Zev.    Additional provider notes: rash     In May, had a few bumps on her left arm - she was on antibiotics at the time.    Clusters of 5-6 bumps with fluid inside.   Unlikely bug bites - she says she's not outside much. 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  No spots like this in her mouth.    She admits she's worried this is life-threatening.    Assessment/Plan:  1. Rash and nonspecific skin eruption  Uncertain precipitating agent. Keep skin clean and use steroid cream to decrease the reaction.  - desonide (DESOWEN) 0.05 % cream; Apply sparingly to affected areas on arms three times daily for up to 2 weeks; then take at least 1 week off  Dispense: 60 g; Refill: 1    2. Dermatitis  As above  Reassurance given to her, to calm her " anxiety. I also made it clear that if this becomes worse, she must let us know.        Phone call duration:  8 minutes  10:22 - 10:30  MD Krystyna Ramirez MD

## 2021-06-11 NOTE — TELEPHONE ENCOUNTER
Medication:       Disp  Refills  Start  End  LENCHO    desogestrel-ethinyl estradiol (APRI) 0.15-0.03 mg per tablet  3 Package  3  11/19/2019   --    Sig - Route: Take 1 tablet by mouth daily. - Oral          Last Office Visit:7/8/2020

## 2021-06-12 NOTE — PROGRESS NOTES
FEMALE PREVENTIVE EXAM    Assessment & Plan   1. Routine general medical examination at a health care facility  Up to date on pap. Not fasting for labs, normal one year ago.      2. Panic attack  New onset panic attacks. Very infrequent but prolonged. Prescription for lorazepam as needed.  Advised on risks and side effects.  Follow up if anxiety episodes become more frequent  - LORazepam (ATIVAN) 0.5 MG tablet; Take 1 tablet (0.5 mg total) by mouth every 6 (six) hours as needed for anxiety.  Dispense: 30 tablet; Refill: 0    3. ABI (generalized anxiety disorder)  Daily anxiety overall well controlled.  States mood has been quite stable this year despite the stressors of COVID.      4. Seasonal allergic rhinitis due to other allergic trigger  Uncontrolled.  Will add on Astelin.  Continue Flonase and Zyrtec.  Consider allergy referral if remains uncontrolled  - azelastine (ASTELIN) 137 mcg (0.1 %) nasal spray; Use in each nostril as directed  Dispense: 30 mL; Refill: 12    5. Encounter for initial prescription of contraceptive pills  Denies risk factors for use.  Refilled one year.    - desogestreL-ethinyl estradioL (APRI) 0.15-0.03 mg per tablet; Take 1 tablet by mouth daily.  Dispense: 3 Package; Refill: 3    Recommend repeat pap smear if normal every five years, Recommended adequate calcium intake/osteoporosis prevention, Discussed breast cancer screening guidelines and Discussed diet, including moderation of portions sizes, avoiding eating out and fast food and increase in fruits and vegetables    Shazia Caban CNP    Subjective:   Chief Complaint:  Annual Exam (not fasting)    HPI:  Harley Quarles is a 30 y.o. female who presents for routine physical exam.    She has been well.  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     ABI: Mood has been relatively stable most days.  She states she has noted a little bit of apathy but otherwise no  depression symptoms. With regards to anxiety, she has experienced 3-4 acute anxiety episodes.  No apparent trigger.  Have lasted up to 24 hours.  Shortness of breath, tightness, racing thoughts.  Fear of not waking up when she goes to sleep.  Has learned deep breathing and grounding techniques in therapy though difficult to utilize them in the moment.      Allergies:  Feels allergic rhinitis has been uncontrolled for several years but particular severe this year.  Constantly congested. Flonase and Zyrtec.      Mom diagnosed with melanoma this year.  Localized, removed early.     OB/Gyn History:  Menstrual history: regular periods, occasionally heavy  Date of previous pap: 2018  History of abnormal pap: none  Current Contraceptive method: OCP    Preventive Health:  Reviewed and recommended screening and treatment recommendations:  Immunizations: up to date    Health Habits:    Calcium intake/Osteoporosis prevention: no    PMH:   Patient Active Problem List   Diagnosis     ABI (generalized anxiety disorder)     History of herpes genitalis     Allergic rhinitis       No past medical history on file.    Current Medications: Reviewed   Current Outpatient Medications on File Prior to Visit   Medication Sig Dispense Refill     cetirizine (ZYRTEC) 10 MG chewable tablet Chew 10 mg daily.       desogestreL-ethinyl estradioL (APRI) 0.15-0.03 mg per tablet Take 1 tablet by mouth daily. 3 Package 1     fluticasone propionate (FLONASE) 50 mcg/actuation nasal spray INSTILL 1 SPRAY INTO EACH NOSTRIL TWICE A DAY 48 g 3     desonide (DESOWEN) 0.05 % cream Apply sparingly to affected areas on arms three times daily for up to 2 weeks; then take at least 1 week off 60 g 1     FLUCELVAX QUAD [id number], PF, 60 mcg (15 mcg x 4)/0.5 mL Syrg PHARMACY ADMINISTERED       No current facility-administered medications on file prior to visit.        Allergies:  Reviewed  is allergic to amoxicillin; nitrofurantoin; and penicillins.    Social  "History:  Social History     Occupational History     Not on file   Tobacco Use     Smoking status: Never Smoker     Smokeless tobacco: Never Used   Substance and Sexual Activity     Alcohol use: Yes     Drug use: No     Sexual activity: Not on file       Family History:   Family History   Problem Relation Age of Onset     Cancer Maternal Grandfather      Mental illness Paternal Grandfather      Prostate cancer Paternal Grandfather          Review of Systems:  Complete head to toe review of systems is otherwise negative except as above.    Objective:    BP 96/70 (Patient Site: Left Arm, Patient Position: Sitting, Cuff Size: Adult Regular)   Pulse 79   Temp 98.2  F (36.8  C) (Oral)   Resp 16   Ht 5' 8\" (1.727 m)   Wt 120 lb 8 oz (54.7 kg)   LMP 10/29/2020 (Exact Date)   SpO2 98%   BMI 18.32 kg/m      GENERAL: Alert, well-appearing female .   PSYCH: Pleasant mood, affect appropriate.  Good judgment and insight.  Intact recent and remote memory. Good eye contact.  SKIN: No atypical lesions. SKs on back  EYES: Conjunctiva pink, sclera white, no exudates. ALEX.  EOMs intact.   EARS: TMs pearly grey, no bulging, redness, retraction.   MOUTH: Pharynx moist, pink without exudate. No tonsillar enlargement  NECK: No lymphadenopathy. Thyroid borders smooth without enlargement, nodules.   CV: Regular rate and rhythm without murmurs, rubs or gallops.  RESP: Lung sounds clear  ABDOMEN: BS+. Abdomen soft.  No organomegaly  BREASTS: Breasts symmetric, no dimpling, masses or skin discolorations seen. Areolas and nipples symmetric without discharge. On palpation, breast tissue supple and nontender. No masses or nodules. Axillary and epitrochlear lymph nodes nonpalpable.   PV :  No edema        "

## 2021-06-13 NOTE — TELEPHONE ENCOUNTER
CMT collected or printed forms from . Forms pre-filled for provider review, completion, and signature. Given to provider. Thanks.

## 2021-06-13 NOTE — TELEPHONE ENCOUNTER
Will provider fill these out? If so, CMT will prep based on note from e-visit and forward to provider in blue folder. Thanks.

## 2021-06-14 NOTE — PROGRESS NOTES
Provider E-Visit time total (minutes): 11    Assessment:   The encounter diagnosis was Acute sinusitis with symptoms > 10 days.     Plan:   1. Acute sinusitis with symptoms > 10 days  Symptoms present > 10-14 days . Trial of antibiotic to treat.  If no improvement consider ENT consult or imaging.   - doxycycline (VIBRA-TABS) 100 MG tablet; Take 1 tablet (100 mg total) by mouth 2 (two) times a day for 7 days. May substitute any formulation of 100mg doxycycline available and best covered by insurance  Dispense: 14 tablet; Refill: 0  Outpatient Encounter Medications as of 12/29/2020   Medication Sig Dispense Refill     azelastine (ASTELIN) 137 mcg (0.1 %) nasal spray One spray in each nostril twice daily 30 mL 12     cetirizine (ZYRTEC) 10 MG chewable tablet Chew 10 mg daily.       desogestreL-ethinyl estradioL (APRI) 0.15-0.03 mg per tablet Take 1 tablet by mouth daily. 3 Package 3     doxycycline (VIBRA-TABS) 100 MG tablet Take 1 tablet (100 mg total) by mouth 2 (two) times a day for 7 days. May substitute any formulation of 100mg doxycycline available and best covered by insurance 14 tablet 0     fluticasone propionate (FLONASE) 50 mcg/actuation nasal spray INSTILL 1 SPRAY INTO EACH NOSTRIL TWICE A DAY 48 g 3     LORazepam (ATIVAN) 0.5 MG tablet Take 1 tablet (0.5 mg total) by mouth every 6 (six) hours as needed for anxiety. 30 tablet 0     No facility-administered encounter medications on file as of 12/29/2020.      Patient Instructions   Carlos Souza,    I'm sorry this has been so persistent! Lets try a course of antibiotics to see if this clears it.  If not, we might consider some sinus imaging or ENT consult.     Shazia        Dear Harley Quarles    After reviewing your responses, I've been able to diagnose you with?a sinus infection caused by bacteria.?     Based on your responses and diagnosis, I have prescribed Doxycycline to treat your symptoms. I have sent this to your pharmacy.?     It is also important to stay  well hydrated, get lots of rest and take over-the-counter decongestants,?tylenol?or ibuprofen if you?are able to?take those medications per your primary care provider to help relieve discomfort.?     It is important that you take?all of?your prescribed medication even if your symptoms are improving after a few doses.? Taking?all of?your medicine helps prevent the symptoms from returning.?     If your symptoms worsen, you develop severe headache, vomiting, high fever (>102), or are not improving in 7 days, please contact your primary care provider for an appointment or visit any of our convenient Walk-in Care or Urgent Care Centers to be seen which can be found on our website?here.?     Thanks again for choosing?us?as your health care partner,?   ?  Shazia Caban?     Based on the information that you have provided, I have placed an order for you to start treatment.  View your full visit summary for details. Click on the link below to access your visit summary.    Your pharmacist will address any questions you may have about taking the medication.    No follow-ups on file.     Subjective:   Harley Quarles is a 30 y.o. female who submitted an eVisit request for evaluation of her Sinus Problem (Entered automatically based on patient selection in [employer]  See the questionnaire and message section of encounter report for information related to history of present illness and review of systems.    The following portions of the patient's history were reviewed and updated as appropriate:  She does not have any pertinent problems on file.    She is allergic to amoxicillin; nitrofurantoin; and penicillins..     Objective:   No exam performed today, patient submitted as eVisit.

## 2021-06-14 NOTE — PATIENT INSTRUCTIONS - HE
Carlos Souza,    I'm sorry this has been so persistent! Lets try a course of antibiotics to see if this clears it.  If not, we might consider some sinus imaging or ENT consult.     Shazia        Dear Harley Quarles    After reviewing your responses, I've been able to diagnose you with?a sinus infection caused by bacteria.?     Based on your responses and diagnosis, I have prescribed Doxycycline to treat your symptoms. I have sent this to your pharmacy.?     It is also important to stay well hydrated, get lots of rest and take over-the-counter decongestants,?tylenol?or ibuprofen if you?are able to?take those medications per your primary care provider to help relieve discomfort.?     It is important that you take?all of?your prescribed medication even if your symptoms are improving after a few doses.? Taking?all of?your medicine helps prevent the symptoms from returning.?     If your symptoms worsen, you develop severe headache, vomiting, high fever (>102), or are not improving in 7 days, please contact your primary care provider for an appointment or visit any of our convenient Walk-in Care or Urgent Care Centers to be seen which can be found on our website?here.?     Thanks again for choosing?us?as your health care partner,?   ?  Shazia Caban?     Based on the information that you have provided, I have placed an order for you to start treatment.  View your full visit summary for details. Click on the link below to access your visit summary.    Your pharmacist will address any questions you may have about taking the medication.

## 2021-06-16 PROBLEM — J30.9 ALLERGIC RHINITIS: Status: ACTIVE | Noted: 2018-10-29

## 2021-06-16 PROBLEM — F41.1 GAD (GENERALIZED ANXIETY DISORDER): Status: ACTIVE | Noted: 2018-10-29

## 2021-06-16 PROBLEM — Z86.19 HISTORY OF HERPES GENITALIS: Status: ACTIVE | Noted: 2018-10-29

## 2021-06-16 NOTE — TELEPHONE ENCOUNTER
Refill Approved    Rx renewed per Medication Renewal Policy. Medication was last renewed on 3/31/20.    Jose De Jesus Barrientos, [facility] Connection Triage/Med Refill 3/30/2021     Requested Prescriptions   Pending Prescriptions Disp Refills     fluticasone propionate (FLONASE) 50 mcg/actuation nasal spray [Pharmacy Med Name: FLUTICASONE PROP 50 MCG SPRAY] 16 g 23     Sig: INSTILL 1 SPRAY INTO EACH NOSTRIL TWICE A DAY       Nasal Steroid Refill Protocol Passed - 3/29/2021 12:08 AM        Passed - Patient has had office visit/physical in last 2 years     Last office visit with prescriber/PCP: Visit date not found OR same dept: Visit date not found OR same specialty: Visit date not found Last physical: 11/9/2020 Last MTM visit: Visit date not found    Next appt within 3 mo: Visit date not found  Next physical within 3 mo: Visit date not found  Prescriber OR PCP: Shazia Caban CNP  Last diagnosis associated with med order: 1. Allergic rhinitis  - fluticasone propionate (FLONASE) 50 mcg/actuation nasal spray [Pharmacy Med Name: FLUTICASONE PROP 50 MCG SPRAY]; INSTILL 1 SPRAY INTO EACH NOSTRIL TWICE A DAY  Dispense: 16 g; Refill: 23     If protocol passes may refill for 12 months if within 3 months of last provider visit (or a total of 15 months).

## 2021-06-18 NOTE — PATIENT INSTRUCTIONS - HE
Patient Instructions by Alisha Johnson MD at 6/3/2020  1:48 PM     Author: Alisha Johnson MD Service: -- Author Type: Physician    Filed: 6/3/2020  1:48 PM Encounter Date: 6/1/2020 Status: Signed    : Alisha Johnson MD (Physician)           Urinary Tract Infections in Women    Urinary tract infections (UTIs) are most often caused by bacteria. These bacteria enter the urinary tract. The bacteria may come from inside the body. Or they may travel from the skin outside the rectum or vagina into the urethra. Female anatomy makes it easy for bacteria from the bowel to enter a womans urinary tract, which is the most common source of UTI. This means women develop UTIs more often than men. Pain in or around the urinary tract is a common UTI symptom. But the only way to know for sure if you have a UTI for the healthcare provider to test your urine. The two tests that may be done are the urinalysis and urine culture.  Types of UTIs    Cystitis. A bladder infection (cystitis) is the most common UTI in women. You may have urgent or frequent need to pee. You may also have pain, burning when you pee, and bloody urine.    Urethritis. This is an inflamed urethra, which is the tube that carries urine from the bladder to outside the body. You may have lower stomach or back pain. You may also have urgent or frequent need to pee.    Pyelonephritis. This is a kidney infection. If not treated, it can be serious and damage your kidneys. In severe cases, you may need to stay in the hospital. You may have a fever and lower back pain.    Medicines to treat a UTI  Most UTIs are treated with antibiotics. These kill the bacteria. The length of time you need to take them depends on the type of infection. It may be as short as 3 days. If you have repeated UTIs, you may need a low-dose antibiotic for several months. Take antibiotics exactly as directed. Dont stop taking them until all of the medicine is gone. If you stop taking the antibiotic  too soon, the infection may not go away. You may also develop a resistance to the antibiotic. This can make it much harder to treat.  Lifestyle changes to treat and prevent UTIs  The lifestyle changes below will help get rid of your UTI. They may also help prevent future UTIs.    Drink plenty of fluids. This includes water, juice, or other caffeine-free drinks. Fluids help flush bacteria out of your body.    Empty your bladder. Always empty your bladder when you feel the urge to pee. And always pee before going to sleep. Urine that stays in your bladder can lead to infection. Try to pee before and after sex as well.    Practice good personal hygiene. Wipe yourself from front to back after using the toilet. This helps keep bacteria from getting into the urethra.    Use condoms during sex. These help prevent UTIs caused by sexually transmitted bacteria. Also don't use spermicides during sex. These can increase the risk for UTIs. Choose other forms of birth control instead. For women who tend to get UTIs after sex, a low-dose of a preventive antibiotic may be used. Be sure to discuss this option with your healthcare provider.    Follow up with your healthcare provider as directed. He or she may test to make sure the infection has cleared. If needed, more treatment may be started.  Date Last Reviewed: 7/1/2019 2000-2019 [employer]. [address] [place] [address], [place] [address]. All rights reserved. This information is not intended as a substitute for professional medical care. Always follow your healthcare professional's instructions.

## 2021-06-21 NOTE — PROGRESS NOTES
Mental Health Visit Note    11/12/2018    Start time: 14:8    Stop Time: 15:00  Intake Session # 1    Session Type: Patient is presenting for an Individual session.    Harley Quarles is a 28 y.o. female is being seen today for    Chief Complaint   Patient presents with     intake session 1     New symptoms or complaints: This is a 28 years old  female presented to this clinic for a diagnostic assessment  having been referred there by her primary care physician Shazia Caban CNP with [facility] to start psychotherapy. Patient presented with a diagnosis of a generalized anxiety disorder which she notes has been impairing her daily functioning. She notes she can not pin point the source of her symptoms.  She also notes she has been worried about what could happen in her life even when things seem fine. She has been putting herself down. She notes diminished self esteem, lack of self confidence. Has been feeling she is not cared for by family but wont find a proof. Parents have shown care and love but she feels they don't show their love enough to her. Patient is here to learn some coping skills to challenge these negative thoughts. Today, patient completed screening tools. She scored 14 on ABI 7 which shows a severe anxiety . She had scored 17 on ABI-7 at her PCP visit  On 10/29. She also scored 4 on PHQ-9 or  Mild depression. Her PHQ-9 score was 5 at her visit with her PCP on 10/29.  Her level of disability was at 8 % showing high level of functioning of 92 % . Patient denied any mood altering substance. She notes she sometimes drinks but a lower quantity and infrequently and scored 0/4.  She denied any SI/HI. She scored 0 on C-SSRS.  Today, patient signed the authorization to review her care everywhere.    Patient will return for her second intake session on 11/19/2048. She will complete her intake questionnaire prior to her next visit.     Functional Impairment:   Personal: 4  Family: 4  Social:  3  Work: 3    Clinical assessment of mental status:   Harley Quarles presented on time.   She was oriented x3, open and cooperative, and dressed appropriately for this session and weather. Her memory was Normal cognitive functioning .  Her speech was  Within normal.  Language was appropriate.  Concentration and focus is Within normal. Psychosis is not noted or reported. She reports her mood is Anxious.  Affect is congruent with speech and is Tearful and Anxious.  Fund of knowledge is adequate. Insight is adequate for therapy.    Suicidal/Homicidal Ideation present: Patient denies suicidal and homicidal ideations/means or plans.     Patient's impression of their current status: Patient indicated she was determined to start therapy to learn how to cope with her anxiety. She shared she does not see why she can't be happy. She has been bringing herself down and wants this to change. Patient has been working and trying to meet her needs. She notes she deserves better. Wishes to have a healthy relationship one day and eventually build a career. She wants to start with therapy to learn how to cope. She will return in a week for the second intake session.    Therapist impression of patients current state: This 28 y.o. White or  female presented on time with motivation. She appeared in a no apparent stress. Writer used AIDET format to welcome and orient the patient in the session. Had the patient complete the informed consent and screening tools. She  appeared to be very motivated and was offered hope for recovery.     Assessment tools used today include: ABI-7:14, PHQ-9:4, CAGE-AID: 0/4 C-SSRS:0; Whodas 2.0: 8 %    Type of psychotherapeutic technique provided: Client centered, CBT and Rapport building    Progress toward short term goals:unable to assess this patient's progress at this first intake session.     Review of long term goals: Not done at today's visit .    Diagnosis:   1. ABI (generalized anxiety disorder)        Improving, worsening, no change: Unable to assess changes at this very first visit.     Plan and Follow-up:    1. Patient will return to therapy for the intake session part 2 in a week  2. Writer will continue to develop therapeutic relationship with patient    Discharge Criteria/Planning: patient will complete the Assessement and develop a treatment plan     Performed and documented by NORI Dash; [facility] 11/12/2018

## 2021-06-21 NOTE — PROGRESS NOTES
"Diagnostic Assessment  [] Brief  [x] Standard  [] Updated  Standard Diagnostic Assessment  Date(s):11/19/2018  Start Time: 10:00  Stop Time:11:00  Patient Name: Harley QuarlesGarett NP. With [facility]    Age: 28 y.o.    YOB: 1990     Referral Source: Shazia Caban CNP with [facility]  Therapist: Leno PORRAS; [facility]       Persons Present: Patient and therapist  Session Type: Patient is presenting for an Individual session.     Chief Complaint (in the patients words; reason patient believes they have been referred): \" I am very anxious. I think a lot of it has to do with ways my parents raised me.  They love, I know but they never showed any sort of affection and were always pushing me. Now I am very insecure with hardly any self confidence. 90 % of my thoughts are negative; like wondering what could happen to me, worse case scenario, no self esteem, I spend so much time criticizing my physical appearence. I don't care what people tell me, I see myself with too much imperfections: Patient also stated she is aware her paternal grand father has anxiety as well but \" I try to hide it\"  Patient s expectation for treatment (patient stated initial goal; i.e.:  I want to let go of my worries , Medication treatment if indicated):\"I wanted to love  Myself and feel less anxious; develop self confidence\"   Presenting Problem/History:  Issues/Stressors: \"New job, no college degree,parents do not show affection to me\"  PLEASE CHEK ALL THAT APPLY  Physical Problems    [x] Dizziness  [] Dry mouth  [] Flushes or chills  [] Sweating  [] Chest pains  [x] Rapid heart pounding  [] Abdominal pain  [] Diarrhea  [] Trembling  [] Disturbing body sensations  [] Incontinence of feces  [] Incontinence of urine  [] Constipation  [] Nausea/vomiting  [] Swallowing problems  [] Blurred vision  [] Headaches  [] Numbness  [] Weight loss  [] Double vision  [] Skin rash  [x] Shortness of breath  [] Weight " "gain  [x] Inability to sleep  [] Sleeping too much  [] Decreased energy  [] Increased energy  [] Decreased appetite  [] Increased appetite  [] Other______________    Social Problems  [] Job problems  [] Problems at school  [] Islam problems  [] Communication problems  [x] Distrust of others  [] No close friends  [] Unstable relationships  [] Uncomfortable when alone  [] Need for excessive advice  [] Need for excessive praise  [x] Problems with mother  [x] Problems with father  [] Problems with relatives  [] Increased social activity  [] Decreased social activity  [] Loss of interest in activities  []  Sexual difficulties  [] Other ______________  Behavioral Problems  [] Reckless  [] Self-injurious behavior  [] Substance abuse  [] Restricted travel from home  [] Restricted eating  [] Self-induced vomiting  [] Suicidal attempts  [] Binge eating  [] Temper outbursts  [] Gambling  []  Excessive work  [x] Other _None reported_____________    Cognitive Problems  [] Distractibility  [] Poor attention  [] Indecisiveness  [] Poor memory  [] Forgetful  [] Perfectionism  [] Disordered thinking  [x] Racing thoughts  [] Disturbing sexual fantasies  [] Bothersome thoughts  [] Special Brandt  [] Procrastination  [] Learning disability_____  [] Recurrent bad memories  []  Hallucinations  [x]  Paranoia  [x]  Worries  [] Other_______________    Emotional Problems  [x] Anxious  [] Angry  [] Rageful  [x] Nervous   [] Apathetic   [x] Irritable  [] Emptiness  [] Boredom  [] Excessive fears  [] Restricted emotion  [x] Depressed mood  [] Elevated mood  [] Mood swings  [] Feelings of shame  [] Feelings of guilt  [x] Lack of self-confidence  [] Inferiority feelings  Other _________  Functional Impairments:   Personal: 4/4  Family:4 /4   Work: 3 /4  Social: 3 /4  How does the presenting problem affect patients daily functioning: Onset/Frequency/Duration presenting problem symptoms: \" \"I first noted I have no  Self- Confidence  when I was " "in 8th grade. Mom would not let me have a make up to school. All other girls looked their best but not me without make up\"  How does the patient perceive his/her problem? Parents think I am exaggerating.  So,  I have been trying to hide it because it is embarrassing to talk about parents to someone.  A few of  my best friends know.They are supportive. \"  Family/Social History:   Current living situation (Household members, housing status, stability, multiple moves, potential eviction): currently living with with a best friend and her brother in a leased house for the last 2 years.  Reports it is safe and no housing issues.   Marriages/Significant other (including patients evaluation of the relationship quality): None reprted  Children (sex and ages, any significant issues): None reported  Parents (ages, living or , how many years ):  Mother( 52) and father(54) are  for over 30 years and live in [facility] [place]. Patient does not feel they showed her affection to her growing up or e.enedelia now.  Notes to high expectations about her. She shared, \" when I failed college, mother cried a lot, wondered what she will tell people, that she will be judged, versus wondering about my life.\"  Siblings (birth order, ages, significant issues): Younger brother( 24). \"N Patient o issues. He lives with me now\"I was the one getting in trouble all the time.\"    Climate in family of origin (how does the patient perceive their childhood experience): \" I had a blast growing up. Went on vacation with family around the [place] in [place] including [place] and overseas like [place]. I thought my parents love me. But now looking back, I see no affection\"   Education (type and level of education): Some college. Had 1 1/2 years left for college \"I could not put all my thoughts together so I failed. \"  Problems with Learning or School (developmental issues, learning disabilities, behavioral concerns in school): was a B and C student during the " "grade school. Math and Science were harder to her. She recalls having to ask her teacher and being given explanations over and over until they could not help because \"I could not retain the information\"  Developmental factors (developmental milestones, head injuries, CVA s, etc. 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   Financial Concerns (basic status, housing, food, clothing are they on any assistance including SSI/SSDI): None reported  Significant life events (what does the patient identify as a personal life changing/influencing event): How I interacted with parents since my 8th grade. We always had fights. I had curfews, they took things away to make me focus but it got worse instead\"  Sexual/physical/emotional/financial abuse/traumatic event. (any child protection involvement; who reported, Impact on patient/family/other): \"Some emotional struggle with my family\"  PTSD Symptoms:     [] Yes, including   [x] No  Contextual Non-personal factors contributing to the patients concerns (divorce in family, nation/natural disasters): None reported   Significant personal relationships including patient s evaluation of the relationship quality (Co-worker s, neighbor s, AA groups, Religion peers, etc.): None reported  Support network(s)/Resources (including strength and quality of social networks, who does the client consider supportive, other agencies or services patient uses):\" Best friend who lives with me, another friend who lives in [place], my cousin who has the same experience as I do and several others friends. They offer me emotional support.\"  Belief system: Grew up in in [email or web address] [place]. Now agnostic.   Cultural influences and impact on patient (ask about all aspects of culture and ask which are relevant to the patient. Go beyond nationality and " "ethnicity. Consider biases, life style, community style, i.e.: urban, poverty, abuse, etc). See page 5 Diagnostic Assessment, Clinical Training for descriptors): Was born  in [place], then parents moved to [place], then [place] [facility], then [facility].  Parents are from upper class who live in the [place] area of  [place] of the places listed above. Father is a  in a local hospital. Mother does accounting for a business. Noted a big contrast population wise, once she moved to the [facility] where the populations as pretty much diverse but sees no issue working with anyone. Plans to join the family in [place] for Thanksgiving. Expects no affection from parents.   Cultural impact on health and health care (how does patient s culture influence how the patient receives health care):  Patient navigates the system on her own. [facility] uses [facility]. She has her own insurance.   Legal Problems (DUI S, divorce, law suits, etc.): None reported  Strengths/personal resources (what does the patient do well, what is going well in life, positive personality characteristics): \" happy person, kind, funny, smart, intuitive, conscious about others, people tell me that I am pretty.\"  Weaknesses (what does patient identify as a weakness): \"lack of self confidence, anxious all the time, thinking about the worse case scenarios, easily annoyed, stressed\"  Hobbies/Interests: \" fashion, hanging up with friends, music, food\"  Assessment of client needs (based on baseline measurements, symptoms, behaviors, skills, abilities, resources, vulnerabilities, safety needs):     Psychotherapy    Ongoing assessment of the needs     Family Mental Health/Medical History  Family Mental Health:   Paternal grand father: anxiety. Also uncle, and dad have undiagnosed anxiety  Family history of Suicide: None reported   Family history Chemical Dependency: None reported  Family Medical history: Paternal grand father is a survivor of prostate; maternal grand " mother: Emphysema- passed away  Patient Medical History  Hospitalizations (When/Where): None reported  Medical diagnoses/concerns: (i.e.: Heart disease, thyroid problems,  Bld. Pressure,  seizures,  head Inj., Other): plans to have a test for lyme disease. Feels she might have had bite from a tick in 2017 while on vacation.   Current physician/other non psychiatric medical provider s: Shazia Caban CNP with [facility]     Date of last medical exam: 10/29/2018  Current Medications:      fluticasone (FLONASE) 50 mcg/actuation nasal spray, 1 spray into each nostril 2 (two) times a day., Disp: 18 g, Rfl: 1     loratadine (CLARITIN) 10 mg tablet, Take 10 mg by mouth daily., Disp: , Rfl:      multivitamin therapeutic tablet, Take 1 tablet by mouth daily., Disp: , Rfl:   Past Mental Health History:  Previous mental health diagnosis & Date of Diagnosis: Generalized anxiety disorder. Since teenage.  Hx of Mental Health Treatment or Services:  Not treated until now. Thinking about starting Lexapro prescribed by her PCP.  NELY Received:       [] Yes   [x] No HE provider  Note, patient signed the Authorization for Care everywhere.      Hx of MH Tx/Hospitalizations (When/Where: must include a review of patient s record.  If not available, why, what if anything are you doing to obtain a record?):  No history of psychiatric hospitalization. Parent and writer reviewed her care everywhere with [facility]. Patient signed authorization at her her first intake visit.     Hx of Psychiatric Medications: Escitalopram oxalate (LEXAPRO) 10 MG tablet    Suicidal/Homicidal Risk Assessment:  Suicidal: None reported  Ideation: none reported  History of Past Attempt(s): description: None reported  Crisis Plan: A card with different counties in the [facility] crisis lines was provided to use as needed.   [place] Suicide Severity Risk Screen: 0  Homicidal: None reported   Ideation:None  "reported  History of Aggression towards others: None reported  Crisis Plan: A card with different counties in the [facility] crisis lines was provided to use as needed.   History of destruction to property: None reported  Chemical Use/Abuse History  Alcohol:   [] None Reported    [x] Yes   [] No  Type:beer/wine/liquor   Frequency (occasionally): 1-2 drinks   Age of first use: Age 18   Date of last use: Past weekend        \"I have decreased quite a use to drink more than 1-2 drinks\"  Street Drugs:   [] None Reported    [x] Yes   [] No  Type: Marijuana   Frequency (daily): daily  Age of first use: 21    Date of last use: Couple of years ago- \"I do not smoke marijuana anymore\"  Prescription Drugs:   [x] None Reported    [] Yes   [] No  Tobacco:   [x] None Reported    [] Yes   [] No  Caffeine:   [] None Reported    [x] Yes   [] No  Type:coffee   Frequency (daily):\" a lot\"  Age of first use: 14 ?    Date of last use: Yesterday- \"No increase,  I only have one cup per day\"  Currently in a treatment program:   [] Yes   [x] No    History of CD Treatment:      [x] None Reported            CAGE-AID (screening to determine a patients use/abuse/dependency): 0/4    Non- Substance Abuse addictive Behaviors/Compulsive Behaviors:  [] Gambling     [] Sex     [] Pornography    [] Shopping     [] Eating     [] Self-Injury  [] Other           [x] None Reported    [] Hoarding  MENTAL STATUS EVALUATION  Grooming: Well groomed  Attire: Appropriate  Age: Appears Stated  Behavior Towards Examiner: Cooperative  Motor Activity: Within normal   Eye Contact: Appropriate  Mood: Anxious  Affect: Irritable and Anxious  Speech/Language: Within normal  Attention: Within normal  Concentration: Within normal  Thought Process: Within normal  Thought Content: Hallucinations: No hallucinations reported nor noted  Delusions: No delusions reported nor noted  Orientation: X 3  Memory: No Evidence of Impairment  Judgement: No Evidence of Impairment  Estimated " Intelligence: Average  Demonstrated Insight: Adequate  Fund of Knowledge: adequate  Clinical Impressions/Assessment/Recommendations:   Clinical summary: This 28-year-old  Female presented to this clinic to complete her diagnostic assessment. She was first seen on 11/12. During that first session, patient provided information that is being reported below.This information was provided at her first session and has not changed. She has been worried about what could happen in her life even when things seem fine. She has been putting herself down. She notes diminished self esteem, lack of self confidence. Has been feeling she is not cared for by family but wont find a proof. Parents have shown care and love but she feels they don't show their love enough to her. Patient is here to learn some coping skills to challenge these negative thoughts. At her first intake session, she scored 14 on ABI 7 which shows a severe anxiety . She had scored 17 on ABI-7 at her PCP visit on 10/29. She also scored 4 on PHQ-9 or  Mild depression. Her PHQ-9 score was 5 at her visit with her PCP on 10/29.  Her level of disability on WHODAS 2.0 was at 8 % showing high level of functioning of 92 % .   Patient denied any mood altering substance. She notes she sometimes drinks but a lower quantity and infrequently and scored 0/4. She reports symptoms that include, anxious, dizziness, rapid heart pounding, distrust of others, problems with both parents, inability to sleep, shortness of breath sometimes,racing thoughts, worries, sometimes feels she has paranoia, lack of self confidence, depressed mood, irritable, nervious, and anxious. Patient has been experiencing these symptoms for sometimes. She notes they only became debilitating as she grew up and had more responsibility. Recalls having some of these symptoms when she was in her early teen. Could not get along with parents. [place] they had too much expectations that were not reasonable.    She notes that she has caring parents but they never show affection. Notes she feels her anxiety stemmed from her upbringing with too much control and unreasonable expectations even up today.  She has a strong support from her best friends while noticing a higher level of mistrust about parents as she feels they are too critical of her. Patients stressed parents do many things with her; they continue to include her in family events including [place]. However, she feels they lack affection which then create lack of self confidence and doubt about her own abilities and physical appearances.  Patient's goal is to learn how to love herself and increase that self confidence.   Prioritization of needed mental Health ancillary or other services:Psychotherapy.  How Diagnostic criteria is met: (Include symptoms, frequency, duration, functional impairments): Patient's intake questionnaire, mental status, two interview sessions; chart review, screening tools. Patient was seen twice to complete this assessment. Patient's chart review including care everywhere were reviewed. Patient signed the authorization to review her chart from outside providers. Patient completed screening tools at the first sessions. The results are listed as follow: patient scored 4 on PHQ-9. His is  Mild depression within the last 2 weeks. She scored 14 on ABI 7 or severe anxiety within the last 2 weeks. She denied any history of SI. She scored 0 on C-SSRS. She denied any tobacco use. She drinks occasionally up to 2 drinks. No issue here and she indicated she is aware of consequences should she drink more. She has been sober from marijuana for the last 2 years and is glad she did. She scored 8 % of level of disability. This means she is able to function at least in the last 30 days, up to 92 %. 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 She notes some difficulties at the job since it is a new position and  she is dealing with other unrelated issues in her life. She also reports symptoms that include anxious, dizziness, rapid heart pounding, distrust of others, problems with both parents, inability to sleep, shortness of breath sometimes,racing thoughts, worries, sometimes feels she has paranoia, lack of self confidence, depressed mood, irritable, nervious, and anxious. Given the information gathered so far, it appears that the patient indeed meets the DSM-5 criteria for a Generalized Anxiety.   Explanation for any provisional diagnosis. Hypothesis why alternative diagnosis was considered and ruled out: none identified today.   Recommendations (treatment, referrals, services needed): Psychotherapy  Diagnosis (non-Axial as defined in DSM-5):Generalized anxiety disorder  Provisional Diagnosis (list only- no explanation needed): None identified    WHODAS 2.0 12-item version:4 or 8%  H1= 30  H2=0  H3= 0  Scores presented in qualifiers to represent level of disability.  MILD problem - (slight, low, ) - 5-24 %   Sources/references used in completing this assessment:   Will be a drop down menu-   -Face to face interview  -Patient Chart  -Adult Intake Questionnaire  -Measures completed: WHODAS: 8%, C-SSRS: 0, CAGE AID: 0, PHQ-9:4, ABI-7:14  -Other_Mental status___________  Assessment of client resolving presenting mental health concerns:  Ability  [] low     [] average     [] high  Motivation [] low     [] average     [] high  Willingness [] low     [] average     [] high  Initial Assessment Objectives (ex: Refer to psychiatry/psych testing, Return for follow up psychotherapy, Refer to, Obtain, Administer measures, etc.):  1. Patient will return to therapy to discuss outcome of diagnostic assessment and create a treatment plan.  2. Writer will continue to develop therapeutic relationship with patient  3. Writer will use  Modalities that include CBT and Mindfulness-based approaches for depression and anxiety.  4.  Writer will  use Motivational Interviewing to increase client's therapeutic engagement and evoke motivation to make positive change.  Is patient's family involved in the treatment?  [] No     [x] Yes  If yes, How? Parents are very inclusive in everything including holidays, vacations,and other family events.   If no, Why?: Parents are not showing affection   Therapist s Signature/Supervision/co-signature statement:  Performed and documented by NORI Dash; [facility] 11/19/2018

## 2021-06-21 NOTE — PROGRESS NOTES
FEMALE PREVENTIVE EXAM    Assessment & Plan   1. Routine general medical examination at a health care facility  Pap smear today, if normal repeat in 3 years.  Discussed risk of cervical cancer, transmission of HPV, and often spontaneous resolution of HPV in the younger population.  She states some of her fears were eased and will discuss screening at her physical in 1 year.      2. ABI (generalized anxiety disorder)  Spent much of today discussing her fairly severe anxiety symptoms.  Meets the criteria for generalized anxiety disorder.  No panic disorder.  Discussed treatment options and she is quite interested in medication management though would like to think about this a bit more.  Advised on onset action, side effects, monitoring.  She will send a message if she would like to start something did specifically discuss starting Lexapro.  Referral for therapist as well.  If starting on medication plan to follow-up in 1 month.  - Ambulatory referral to Psychology    3. History of herpes genitalis  Discussed risks of transmission and treatment options.  She has not had any outbreaks since her initial outbreak so likelihood is low but still possible.    4. Allergic rhinitis  Recommended switching from loratadine to cetirizine in addition of Flonase twice daily.  If no improvement consider addition of Singulair or referral to allergy.  - fluticasone (FLONASE) 50 mcg/actuation nasal spray; 1 spray into each nostril 2 (two) times a day.  Dispense: 18 g; Refill: 1    5. Screening for cervical cancer    - Gynecologic Cytology (PAP Smear)    Recommend repeat pap smear if normal every three years, Discussed breast cancer screening guidelines, Discussed safe sex practices and Discussed diet, including moderation of portions sizes, avoiding eating out and fast food and increase in fruits and vegetables    Shazia Caban CNP    Subjective:   Chief Complaint:  Establish Care; Annual Exam (non-fasting); Anxiety (has had problems  with anxiety for a while now, has not been seen for this previously - would like to discuss today); Allergies; and Herpes Zoster (since 2015, would like to discuss this)    HPI:  Harley Quarles is a 27 y.o. female who presents for routine physical exam.    She is a new patient to the clinic.  Previously seen at [facility] in [place].  She moved to the area 1 year ago to be closer to family.  Past medical history negative for chronic concern. She works in hospitality for an apartment building, showing units to prospective renters.      Anxiety: She states her main concern today is generalized anxiety.  She describes a history of anxiety for most of her life though significant worsening in the last year.  She feels anxiety dictates most of what she does.  Her mind is constantly racing, she feels on edge, irritable.  More recently thoughts are focused around physical ailments and fears of potential cancer diagnoses.  She denies depressed mood or anhedonia.  She is able to function at work.  She is sleeping okay at night.  She does have a family history of anxiety in her grandfather.  She denies any issues with substance abuse.  ABI 7 equals 17 today.    HSV: History of genital herpes infection.  She has not had future outbreaks since her initial outbreak.  She wonders about risks of transmission.    Cervical cancer screening: No prior history of abnormal Pap smears.  She is quite anxious about this as she states she fears potential cervical cancer.  Request screening more often than every 3 years.    Allergic rhinitis: Significant difficulty with nasal congestion and postnasal drip.  She does feel this has worsened since moving to a new apartment a year ago.  She states the previous renter did have cats.  She has been trying generic Claritin OTC without relief.  No previous allergy testing.    OB/Gyn History:  Menstrual history: Regular periods  Date of previous pap: 2015  History of abnormal pap: None  Current  "Contraceptive method: Not currently sexually active.    Preventive Health:  Reviewed and recommended screening and treatment recommendations:  Immunizations: Up-to-date    Health Habits:    Exercise: No.  Calcium intake/Osteoporosis prevention: No    PMH:   Patient Active Problem List   Diagnosis     ABI (generalized anxiety disorder)     History of herpes genitalis     Allergic rhinitis       No past medical history on file.    Current Medications: Reviewed   No current outpatient prescriptions on file prior to visit.     No current facility-administered medications on file prior to visit.        Allergies:  Reviewed  is allergic to amoxicillin.    Social History:  Social History     Occupational History     Not on file.     Social History Main Topics     Smoking status: Never Smoker     Smokeless tobacco: Never Used     Alcohol use Yes     Drug use: No     Sexual activity: Not on file       Family History:   Family History   Problem Relation Age of Onset     Cancer Maternal Grandfather      Mental illness Paternal Grandfather      Prostate cancer Paternal Grandfather          Review of Systems:  Complete head to toe review of systems is otherwise negative except as above.    Objective:    /72 (Patient Site: Right Arm, Patient Position: Sitting, Cuff Size: Adult Regular)  Pulse 75  Ht 5' 7.75\" (1.721 m)  Wt 118 lb (53.5 kg)  LMP 10/08/2018 (Approximate)  SpO2 99%  Breastfeeding? No  BMI 18.07 kg/m2    GENERAL: Alert, well-appearing female  PSYCH: Tearful throughout.  Appears anxious, shaky.  Good judgment and insight.   SKIN: No atypical lesions  EYES: Conjunctiva pink, sclera white, no exudates. ALEX.  EOMs intact.  EARS: TMs pearly grey, no bulging, redness, retraction.   MOUTH: Pharynx moist, pink without exudate. No tonsillar enlargement  NECK: No lymphadenopathy. Thyroid borders smooth without enlargement, nodules.   CV: Regular rate and rhythm without murmurs, rubs or gallops.  RESP: Lung sounds " clear, symmetric excursion.   ABDOMEN: BS+. Abdomen soft.  No organomegaly  BREASTS: Breasts symmetric, no dimpling, masses or skin discolorations seen. Areolas and nipples symmetric without discharge. On palpation, breast tissue supple and nontender. No masses or nodules. Axillary and epitrochlear lymph nodes nonpalpable.    FEMALE: External genitalia without lesions. Vaginal walls and cervix without lesions or masses. No abnormal discharge. Pap smear obtained. On bimanual palpation, uterus mobile, normal shape and contour. No adnexal masses or tenderness.   PV :  No edema

## 2021-06-23 NOTE — TELEPHONE ENCOUNTER
Medication: Acyclovir 400mg   Pharmacy: [facility] [place]   Last OV: 10/29/2018    Please advise on refill.     DENIA/NATALIA

## 2021-06-23 NOTE — TELEPHONE ENCOUNTER
Refill Approved    Rx renewed per Medication Renewal Policy. Medication was last renewed on 10/29/18. 11/8/18    Desirae Suazo, [facility] Triage/Med Refill 1/28/2019     Requested Prescriptions   Pending Prescriptions Disp Refills     fluticasone (FLONASE) 50 mcg/actuation nasal spray [Pharmacy Med Name: FLUTICASONE PROP 50 MCG SPRAY]  1     Sig: INSTILL 1 SPRAY INTO EACH NOSTRIL TWICE A DAY    Nasal Steroid Refill Protocol Passed - 1/26/2019 12:37 AM       Passed - Patient has had office visit/physical in last 2 years    Last office visit with prescriber/PCP: Visit date not found OR same dept: Visit date not found OR same specialty: Visit date not found Last physical: 10/29/2018 Last MTM visit: Visit date not found    Next appt within 3 mo: Visit date not found  Next physical within 3 mo: Visit date not found  Prescriber OR PCP: Shazia Caban CNP  Last diagnosis associated with med order: 1. Allergic rhinitis  - fluticasone (FLONASE) 50 mcg/actuation nasal spray [Pharmacy Med Name: FLUTICASONE PROP 50 MCG SPRAY]; INSTILL 1 SPRAY INTO EACH NOSTRIL TWICE A DAY; Refill: 1    2. ABI (generalized anxiety disorder)  - escitalopram oxalate (LEXAPRO) 10 MG tablet [Pharmacy Med Name: ESCITALOPRAM 10 MG TABLET]; TAKE 1 TABLET BY MOUTH EVERY DAY  Dispense: 30 tablet; Refill: 1     If protocol passes may refill for 12 months if within 3 months of last provider visit (or a total of 15 months).              escitalopram oxalate (LEXAPRO) 10 MG tablet [Pharmacy Med Name: ESCITALOPRAM 10 MG TABLET] 30 tablet 1     Sig: TAKE 1 TABLET BY MOUTH EVERY DAY    SSRI Refill Protocol  Passed - 1/26/2019 12:37 AM       Passed - PCP or prescribing provider visit in last year    Last office visit with prescriber/PCP: Visit date not found OR same dept: Visit date not found OR same specialty: Visit date not found  Last physical: 10/29/2018 Last MTM visit: Visit date not found   Next visit within 3 mo: Visit date not found  Next physical  within 3 mo: Visit date not found  Prescriber OR PCP: Shazia Caban CNP  Last diagnosis associated with med order: 1. Allergic rhinitis  - fluticasone (FLONASE) 50 mcg/actuation nasal spray [Pharmacy Med Name: FLUTICASONE PROP 50 MCG SPRAY]; INSTILL 1 SPRAY INTO EACH NOSTRIL TWICE A DAY; Refill: 1    2. ABI (generalized anxiety disorder)  - escitalopram oxalate (LEXAPRO) 10 MG tablet [Pharmacy Med Name: ESCITALOPRAM 10 MG TABLET]; TAKE 1 TABLET BY MOUTH EVERY DAY  Dispense: 30 tablet; Refill: 1    If protocol passes may refill for 12 months if within 3 months of last provider visit (or a total of 15 months).

## 2021-06-23 NOTE — TELEPHONE ENCOUNTER
Medication Request  Medication name: acyclovir 400 mg  Pharmacy Name and Location: [facility]  Reason for request: Patient stated she currently has an outbreak of genital herpes and she would like this Rx. This started 3 days ago.    Patient was at the pharmacy, so patient transferred to the [facility].  When did you use medication last?:  2 years ago  Okay to leave a detailed message: yes  [phone]

## 2021-07-03 NOTE — ADDENDUM NOTE
Addendum Note by Saulo Van MD at 6/8/2020  5:20 PM     Author: Saulo Van MD Service: -- Author Type: Physician    Filed: 6/8/2020  5:20 PM Encounter Date: 6/2/2020 Status: Signed    : Saulo Van MD (Physician)    Addended by: SAULO VAN on: 6/8/2020 05:20 PM        Modules accepted: Orders

## 2021-07-03 NOTE — ADDENDUM NOTE
Addendum Note by Jolly Rebolledo CNP at 11/8/2018 12:59 PM     Author: Jolly Rebolledo CNP Service: -- Author Type: Nurse Practitioner    Filed: 11/8/2018 12:59 PM Encounter Date: 11/7/2018 Status: Signed    : Jolly Rebolledo CNP (Nurse Practitioner)    Addended by: JOLLY REBOLLEDO on: 11/8/2018 12:59 PM        Modules accepted: Orders

## 2021-07-03 NOTE — ADDENDUM NOTE
Addendum Note by Jolly Rebolledo CNP at 11/8/2018  2:54 PM     Author: Jolly Rebolledo CNP Service: -- Author Type: Nurse Practitioner    Filed: 11/8/2018  2:54 PM Encounter Date: 11/7/2018 Status: Signed    : Jolly Rebolledo CNP (Nurse Practitioner)    Addended by: JOLLY REBOLLEDO on: 11/8/2018 02:54 PM        Modules accepted: Orders

## 2021-07-07 ENCOUNTER — COMMUNICATION - HEALTHEAST (OUTPATIENT)
Dept: FAMILY MEDICINE | Facility: CLINIC | Age: 31
End: 2021-07-07

## 2021-07-07 DIAGNOSIS — J32.9 RECURRENT SINUSITIS: ICD-10-CM

## 2021-08-18 ENCOUNTER — OFFICE VISIT (OUTPATIENT)
Dept: OTOLARYNGOLOGY | Facility: CLINIC | Age: 31
End: 2021-08-18
Attending: NURSE PRACTITIONER
Payer: COMMERCIAL

## 2021-08-18 DIAGNOSIS — J33.9 NASAL POLYP: Primary | ICD-10-CM

## 2021-08-18 PROCEDURE — 99203 OFFICE O/P NEW LOW 30 MIN: CPT | Performed by: OTOLARYNGOLOGY

## 2021-08-18 RX ORDER — METHYLPREDNISOLONE 4 MG
TABLET, DOSE PACK ORAL
Qty: 21 TABLET | Refills: 0 | Status: SHIPPED | OUTPATIENT
Start: 2021-08-18 | End: 2021-12-09

## 2021-08-18 RX ORDER — LORATADINE 10 MG/1
10 TABLET ORAL DAILY
COMMUNITY

## 2021-08-18 NOTE — LETTER
8/18/2021         RE: Harley Quarles  [address]  [facility] [address]        Dear Colleague,    Thank you for referring your patient, Harley Quarles, to the [facility]. Please see a copy of my visit note below.    HPI: This patient is a 31yo F who presents for evaluation of her nose at the request of Dr. Shazia Caban, CNP. The patient reports chronic nasal congestion for years that has not responded to any OTC nasal sprays or allergy medications. She has looked in her nose and thinks that the turbinate might be too big, but does note that what she sees is mostly whitish. There have not really been episodes of high fever, localized sinus pain, tooth pain, and pururlent drainage.     Past medical history, surgical history, social history, family history, medications, and allergies have been reviewed with the patient and are documented above.    Review of Systems: a 10-system review was performed. Pertinent positives are noted in the HPI and on a separate scanned document in the chart.    PHYSICAL EXAMINATION:  GEN: no acute distress, normocephalic  EYES: extraocular movements are intact, pupils are equal and round. Sclera clear.   EARS: auricles are normally formed. The external auditory canals are clear with minimal to no cerumen. Tympanic membranes are intact bilaterally with no signs of infection, effusion, retractions, or perforations.  NOSE: anterior nares are patent. The septum is non-obstructing. Obstructive nasal polyposis L>R.  OC/OP: clear, dentition is in good repair but with wear that is consistent with clenching/grinding. The tongue and palate are fully mobile and symmetric. No masses or lesions.  NECK: soft and supple. No lymphadenopathy or masses. Airway is midline.  NEURO: CN VII and XII symmetric. alert and oriented. No spontaneous nystagmus. Gait is normal.  PULM: breathing comfortably on room air, normal chest expansion with respiration  CARDS: no cyanosis or  clubbing. Normal carotid pulses.    MEDICAL DECISION-MAKING: This patient is a 29yo F with sinonasal polyposis. Will need a CT sinus to determine the extent of the polyps. Also discussed the risk and benefits of a FESS to remove the polyps. She will schedule at her convenience and a pre-op medrol dose pack Rx was sent to her pharmacy.          Again, thank you for allowing me to participate in the care of your patient.        Sincerely,        Jayna Boykin MD     -1

## 2021-08-18 NOTE — PROGRESS NOTES
HPI: This patient is a 31yo F who presents for evaluation of her nose at the request of Dr. Shazia Caban CNP. The patient reports chronic nasal congestion for years that has not responded to any OTC nasal sprays or allergy medications. She has looked in her nose and thinks that the turbinate might be too big, but does note that what she sees is mostly whitish. There have not really been episodes of high fever, localized sinus pain, tooth pain, and pururlent drainage.     Past medical history, surgical history, social history, family history, medications, and allergies have been reviewed with the patient and are documented above.    Review of Systems: a 10-system review was performed. Pertinent positives are noted in the HPI and on a separate scanned document in the chart.    PHYSICAL EXAMINATION:  GEN: no acute distress, normocephalic  EYES: extraocular movements are intact, pupils are equal and round. Sclera clear.   EARS: auricles are normally formed. The external auditory canals are clear with minimal to no cerumen. Tympanic membranes are intact bilaterally with no signs of infection, effusion, retractions, or perforations.  NOSE: anterior nares are patent. The septum is non-obstructing. Obstructive nasal polyposis L>R.  OC/OP: clear, dentition is in good repair but with wear that is consistent with clenching/grinding. The tongue and palate are fully mobile and symmetric. No masses or lesions.  NECK: soft and supple. No lymphadenopathy or masses. Airway is midline.  NEURO: CN VII and XII symmetric. alert and oriented. No spontaneous nystagmus. Gait is normal.  PULM: breathing comfortably on room air, normal chest expansion with respiration  CARDS: no cyanosis or clubbing. Normal carotid pulses.    MEDICAL DECISION-MAKING: This patient is a 31yo F with sinonasal polyposis. Will need a CT sinus to determine the extent of the polyps. Also discussed the risk and benefits of a FESS to remove the polyps. She will  schedule at her convenience and a pre-op medrol dose pack Rx was sent to her pharmacy.

## 2021-08-24 ENCOUNTER — TELEPHONE (OUTPATIENT)
Dept: OTOLARYNGOLOGY | Facility: CLINIC | Age: 31
End: 2021-08-24

## 2021-08-24 NOTE — LETTER
We've received instruction to get you scheduled for surgery with Dr Boykin. We have that arranged as follows:     Surgery Date: 9/27/2021  Location: [address], [address] (3rd floor) [facility]  Arrival Time: TBD (instructed by the pre-op call nurse)     Prep Instructions:     1. Please schedule a pre-op physical with your primary care doctor. This may be virtual or face-to-face depending on your doctors preference. Call them right away to schedule this.    2. PCR-Rated COVID19 testing is required within 4 days of surgery. We have this scheduled for you at [facility], [address] [place] [address], 1st Floor on 9/23/2021 at 3:00pm. Follow the specific instructions you receive by Zev. If your test is positive, your surgery will be canceled.     3. Nothing to eat or drink for 8 hours before surgery unless instructed differently by the pre-op nurse.    4. No blood thinners including aspirin for one week prior to surgery. Verify this is safe for you with your primary care doctor before stopping.     5. You will need an adult to drive you home and stay with you 24 hours after surgery.     6. You may have one family member wait in the lobby at the surgery center during your surgery.    7. When you arrive to the surgery center, you will be screened for COVID19 symptoms. If you screen positive, your surgery will need to be postponed for your safety.    8. If the community sees a new surge in COVID19 hospital admissions, your procedure may need to be postponed. We will contact you if this happens.    9. We always encourage you to notify your insurance any time you have something scheduled including surgery. The number is usually right on the back of your insurance card. Please call [facility] Cost of Care at [phone] for the surgeon fees, and [facility] Cost of Care [phone] for facility fees if you need pricing information.     10. You will receive a call  from a pre-op call nurse 1-3 days prior to surgery. She will go over more details with you.     Call our office if you have any questions! Thank you!

## 2021-08-24 NOTE — TELEPHONE ENCOUNTER
Spoke with Harley over the phone today regarding surgery scheduling with Dr. Small [facility] on  date: 9/27/2021.    Covid Test: Date: 9/23/2021 at 3pm, Location: [facility]    Letter sent via [employer]

## 2021-08-26 PROBLEM — J33.9 NASAL POLYP: Status: ACTIVE | Noted: 2021-08-26

## 2021-08-31 DIAGNOSIS — Z11.59 ENCOUNTER FOR SCREENING FOR OTHER VIRAL DISEASES: ICD-10-CM

## 2021-09-21 ENCOUNTER — TELEPHONE (OUTPATIENT)
Dept: OTOLARYNGOLOGY | Facility: CLINIC | Age: 31
End: 2021-09-21

## 2021-09-21 NOTE — TELEPHONE ENCOUNTER
Harley called in to cancel her surgery scheduled with Dr. Boykin on 9/27. She will call back when she is ready to schedule.  GERBER was notified

## 2021-10-01 ENCOUNTER — TELEPHONE (OUTPATIENT)
Dept: OTOLARYNGOLOGY | Facility: CLINIC | Age: 31
End: 2021-10-01

## 2021-10-01 NOTE — LETTER
We've received instruction to get you scheduled for surgery with Dr Boykin. We have that arranged as follows:     Surgery Date: 12/14/2021  Location: [address], [address] (3rd floor) [facility]  Arrival Time: 6:30 am (Unless instructed differently by the pre-op call nurse)     Post op Appointment: 12/22/2021 at 8:15 am with Dr. Boykin     Prep Instructions:     1. Please schedule a pre-op physical with your primary care doctor. This may be virtual or face-to-face depending on your doctors preference. Call them right away to schedule this.    2. PCR-Rated COVID19 testing is required within 4 days of surgery. We have this scheduled for you at [facility], [address] [place] [address], 1st Floor on 12/10/2021 at 3:00pm. Follow the specific instructions you receive by Zev. If your test is positive, your surgery will be canceled.     3. Nothing to eat or drink for 8 hours before surgery unless instructed differently by the pre-op nurse.    4. If the community sees a new COVID19 surge, your procedure may need to be postponed. We will contact you if this happens.     5. You will need an adult to drive you home and stay with you 24 hours after surgery.     6. You may have one family member wait in the lobby at the surgery center during your surgery. Visitor restrictions are subject to change, please verify with the pre-op nurse when they call.    7. When you arrive to the surgery center, you will be screened for COVID19 symptoms. If you screen positive, your surgery will need to be postponed.    8. We always encourage you to notify your insurance any time you have medical tests or procedures scheduled including surgery. The number is usually right on the back of your insurance card. Please call [facility] Cost of Care at [phone] for the surgeon fees, and [facility] Cost of Care [phone] for facility fees if you need pricing information.     9. You will  receive a call from a pre-op call nurse 1-3 days prior to surgery. She will go over more details with you.     Call our office if you have any questions! Thank you!

## 2021-10-01 NOTE — TELEPHONE ENCOUNTER
Spoke with Harley montgomery who decided to r/s her surgery with  at [facility] on 12/14.  covid Testing schedule 12/10/2021 at 3pm [facility]  Post op scheduled 12/22/2021 at 8:15am

## 2021-10-11 ENCOUNTER — HEALTH MAINTENANCE LETTER (OUTPATIENT)
Age: 31
End: 2021-10-11

## 2021-10-20 ENCOUNTER — TELEPHONE (OUTPATIENT)
Dept: OTOLARYNGOLOGY | Facility: CLINIC | Age: 31
End: 2021-10-20

## 2021-10-20 NOTE — TELEPHONE ENCOUNTER
Spoke with Harley again today after she left me a message.  She has selected to cancel her surgery again at this time and will contact me when ready to r/s

## 2021-10-25 DIAGNOSIS — Z30.011 ENCOUNTER FOR INITIAL PRESCRIPTION OF CONTRACEPTIVE PILLS: ICD-10-CM

## 2021-10-26 RX ORDER — DESOGESTREL AND ETHINYL ESTRADIOL 0.15-0.03
1 KIT ORAL DAILY
Qty: 84 TABLET | Refills: 0 | Status: SHIPPED | OUTPATIENT
Start: 2021-10-26 | End: 2021-12-09

## 2021-10-26 NOTE — TELEPHONE ENCOUNTER
"Last Written Prescription Date:  11/9/20  Last Fill Quantity: 3,  # refills: 3   Last office visit provider:  11/9/20     Requested Prescriptions   Pending Prescriptions Disp Refills     APRI 0.15-30 MG-MCG tablet [Pharmacy Med Name: APRI 28 DAY TABLET] 84 tablet 3     Sig: TAKE 1 TABLET BY MOUTH EVERY DAY       Contraceptives Protocol Passed - 10/25/2021 12:27 AM        Passed - Patient is not a current smoker if age is 35 or older        Passed - Recent (12 mo) or future (30 days) visit within the authorizing provider's specialty     Patient has had an office visit with the authorizing provider or a provider within the authorizing providers department within the previous 12 mos or has a future within next 30 days. See \"Patient Info\" tab in inbasket, or \"Choose Columns\" in Meds & Orders section of the refill encounter.              Passed - Medication is active on med list        Passed - No active pregnancy on record        Passed - No positive pregnancy test in past 12 months             Jose De Jesus Barrientos RN 10/26/21 10:11 AM  "

## 2021-11-30 ENCOUNTER — TELEPHONE (OUTPATIENT)
Dept: SURGERY | Facility: CLINIC | Age: 31
End: 2021-11-30
Payer: COMMERCIAL

## 2021-11-30 NOTE — LETTER
We've received instruction to get you scheduled for surgery with Dr Boykin. We have that arranged as follows:     Surgery Date: 1/25/2022  Location: [address], [address] (3rd floor) [facility]  Arrival Time: 6:30 am (Unless instructed differently by the pre-op call nurse)     Post op Appointment: 2/11/2022 at 2:45pm with Dr. Boykin     Prep Instructions:     1. Please schedule a pre-op physical with your primary care doctor. This may be virtual or face-to-face depending on your doctors preference. Call them right away to schedule this.    2. PCR-Rated COVID19 testing is required within 4 days of surgery. We have this scheduled for you at [facility], [address] [place] [address], 1st Floor on 1/21/2022 at 11:45 am. Follow the specific instructions you receive by Zev. If your test is positive, your surgery will be canceled.     3. Nothing to eat or drink for 8 hours before surgery unless instructed differently by the pre-op nurse.    4. If the community sees a new COVID19 surge, your procedure may need to be postponed. We will contact you if this happens.     5. You will need an adult to drive you home and stay with you 24 hours after surgery.     6. You may have one family member wait in the lobby at the surgery center during your surgery. Visitor restrictions are subject to change, please verify with the pre-op nurse when they call.    7. When you arrive to the surgery center, you will be screened for COVID19 symptoms. If you screen positive, your surgery will need to be postponed.    8. We always encourage you to notify your insurance any time you have medical tests or procedures scheduled including surgery. The number is usually right on the back of your insurance card. Please call [facility] Cost of Care at [phone] for the surgeon fees, and [facility] Cost of Care [phone] for facility fees if you need pricing information.     9. You will  receive a call from a pre-op call nurse 1-3 days prior to surgery. She will go over more details with you.     Call our office if you have any questions! Thank you!

## 2021-11-30 NOTE — TELEPHONE ENCOUNTER
Patient called to r/s surgery    Spoke with Harley over the phone today regarding surgery scheduling with Dr. Small MSC on  date: 1/25/2022.    Covid Test: Date: 1/21/2022 at 11;45 am, Location: [facility]  Post Op: Date: 2/11/2022 at 2:45pm, Location: [facility]    Letter sent via [employer]

## 2021-12-02 ENCOUNTER — MYC MEDICAL ADVICE (OUTPATIENT)
Dept: FAMILY MEDICINE | Facility: CLINIC | Age: 31
End: 2021-12-02
Payer: COMMERCIAL

## 2021-12-02 DIAGNOSIS — B00.9 HERPES SIMPLEX VIRUS INFECTION: Primary | ICD-10-CM

## 2021-12-02 RX ORDER — VALACYCLOVIR HYDROCHLORIDE 500 MG/1
500 TABLET, FILM COATED ORAL 2 TIMES DAILY
Qty: 6 TABLET | Refills: 3 | Status: SHIPPED | OUTPATIENT
Start: 2021-12-02 | End: 2022-01-25

## 2021-12-09 ENCOUNTER — OFFICE VISIT (OUTPATIENT)
Dept: FAMILY MEDICINE | Facility: CLINIC | Age: 31
End: 2021-12-09
Payer: COMMERCIAL

## 2021-12-09 VITALS
HEIGHT: 67 IN | BODY MASS INDEX: 19.3 KG/M2 | OXYGEN SATURATION: 99 % | SYSTOLIC BLOOD PRESSURE: 128 MMHG | HEART RATE: 88 BPM | DIASTOLIC BLOOD PRESSURE: 80 MMHG | WEIGHT: 123 LBS

## 2021-12-09 DIAGNOSIS — Z00.00 ROUTINE GENERAL MEDICAL EXAMINATION AT A HEALTH CARE FACILITY: Primary | ICD-10-CM

## 2021-12-09 DIAGNOSIS — Z12.4 SCREENING FOR CERVICAL CANCER: ICD-10-CM

## 2021-12-09 DIAGNOSIS — Z30.40 ENCOUNTER FOR SURVEILLANCE OF CONTRACEPTIVES, UNSPECIFIED CONTRACEPTIVE: ICD-10-CM

## 2021-12-09 PROCEDURE — G0123 SCREEN CERV/VAG THIN LAYER: HCPCS | Performed by: NURSE PRACTITIONER

## 2021-12-09 PROCEDURE — 99395 PREV VISIT EST AGE 18-39: CPT | Mod: 25 | Performed by: NURSE PRACTITIONER

## 2021-12-09 PROCEDURE — 90686 IIV4 VACC NO PRSV 0.5 ML IM: CPT | Performed by: NURSE PRACTITIONER

## 2021-12-09 PROCEDURE — 87624 HPV HI-RISK TYP POOLED RSLT: CPT | Performed by: NURSE PRACTITIONER

## 2021-12-09 PROCEDURE — 90471 IMMUNIZATION ADMIN: CPT | Performed by: NURSE PRACTITIONER

## 2021-12-09 RX ORDER — DESOGESTREL AND ETHINYL ESTRADIOL 0.15-0.03
1 KIT ORAL DAILY
Qty: 84 TABLET | Refills: 3 | Status: SHIPPED | OUTPATIENT
Start: 2021-12-09 | End: 2022-11-22

## 2021-12-09 ASSESSMENT — MIFFLIN-ST. JEOR: SCORE: 1305.55

## 2021-12-09 NOTE — PROGRESS NOTES
SUBJECTIVE:   CC: Harley Quarles is an 31 year old woman who presents for preventive health visit.     She has been well.  No changes to health.  She continues to work as an . Lives with her long-term partner.  No health concerns.     Anxiety: last year was prescribed lorazepam as needed for acute anxiety episodes.  She states she has actually never taken this. Nervous about how it might make her feel.  Does still experience anxiety episodes a couple of times a week.  Oftentimes starts with awareness of her breathing and feeling that it is 'tight' and then anxiety about breathing increases.  She is not sure why she focuses on her respirations, no other difficulty with breathing except for these episodes.     OB/Gyn History:  Menstrual history: regular periods on OCP  Date of previous pap: 2018  History of abnormal pap: none  Current Contraceptive method: OCP    Preventive Health:  Reviewed and recommended screening and treatment recommendations:  Immunizations: flu shot today    Patient has been advised of split billing requirements and indicates understanding: Yes  Healthy Habits:     Getting at least 3 servings of Calcium per day:  Yes    Bi-annual eye exam:  Yes    Dental care twice a year:  Yes    Sleep apnea or symptoms of sleep apnea:  None    Diet:  Regular (no restrictions)    Frequency of exercise:  2-3 days/week    Duration of exercise:  30-45 minutes    Taking medications regularly:  Yes    PHQ-2 Total Score: 0    Additional concerns today:  No              Today's PHQ-2 Score:   PHQ-2 ( 1999 [employer]) 12/7/2021   Q1: Little interest or pleasure in doing things 0   Q2: Feeling down, depressed or hopeless 0   PHQ-2 Score 0   Q1: Little interest or pleasure in doing things Not at all   Q2: Feeling down, depressed or hopeless Not at all   PHQ-2 Score 0       Abuse: Current or Past (Physical, Sexual or Emotional) - No  Do you feel safe in your environment? Yes    Have you ever done Advance Care  Planning? (For example, a Health Directive, POLST, or a discussion with a medical provider or your loved ones about your wishes): No, advance care planning information given to patient to review.  Patient declined advance care planning discussion at this time.    Social History     Tobacco Use     Smoking status: Never Smoker     Smokeless tobacco: Never Used   Substance Use Topics     Alcohol use: Yes         Alcohol Use 12/7/2021   Prescreen: >3 drinks/day or >7 drinks/week? No       Reviewed orders with patient.  Reviewed health maintenance and updated orders accordingly - Yes      Breast Cancer Screening:    FHS-7:   Breast CA Risk Assessment (FHS-7) 11/15/2021 11/15/2021 11/15/2021 12/7/2021   Did any of your first-degree relatives have breast or ovarian cancer? No No No No   Did any of your relatives have bilateral breast cancer? No No No No   Did any man in your family have breast cancer? No No No No   Did any woman in your family have breast and ovarian cancer? No No No No   Did any woman in your family have breast cancer before age 50 y? No No No No   Do you have 2 or more relatives with breast and/or ovarian cancer? No No No No   Do you have 2 or more relatives with breast and/or bowel cancer? No No No No       Patient under 40 years of age: Routine Mammogram Screening not recommended.   Pertinent mammograms are reviewed under the imaging tab.    History of abnormal Pap smear: NO - age 30-65 PAP every 5 years with negative HPV co-testing recommended  PAP / HPV Latest Ref Rng & Units 10/29/2018   PAP - Negative for squamous intraepithelial lesion or malignancy  Electronically signed by Kathleen Majano CT (ASCP) on 11/5/2018 at 11:48 AM     HPV16 NEG Negative   HPV18 NEG Negative   HRHPV NEG Negative     Reviewed and updated as needed this visit by clinical staff  Tobacco   Meds             Reviewed and updated as needed this visit by Provider                   Review of Systems  CONSTITUTIONAL:  "NEGATIVE for fever, chills, change in weight  INTEGUMENTARU/SKIN: NEGATIVE for worrisome rashes, moles or lesions  EYES: NEGATIVE for vision changes or irritation  ENT: NEGATIVE for ear, mouth and throat problems  RESP: NEGATIVE for significant cough or SOB  BREAST: NEGATIVE for masses, tenderness or discharge  CV: NEGATIVE for chest pain, palpitations or peripheral edema  GI: NEGATIVE for nausea, abdominal pain, heartburn, or change in bowel habits  : NEGATIVE for unusual urinary or vaginal symptoms. Periods are regular.  MUSCULOSKELETAL: NEGATIVE for significant arthralgias or myalgia  NEURO: NEGATIVE for weakness, dizziness or paresthesias  PSYCHIATRIC: NEGATIVE for changes in mood or affect     OBJECTIVE:   /80   Pulse 88   Ht 1.702 m (5' 7\")   Wt 55.8 kg (123 lb)   LMP 11/25/2021   SpO2 99%   BMI 19.26 kg/m    Physical Exam  GENERAL: healthy, alert and no distress  EYES: Eyes grossly normal to inspection, PERRL and conjunctivae and sclerae normal  HENT: ear canals and TM's normal, nose and mouth without ulcers or lesions  NECK: no adenopathy, no asymmetry, masses, or scars and thyroid normal to palpation  RESP: lungs clear to auscultation - no rales, rhonchi or wheezes  BREAST: normal without masses, tenderness or nipple discharge and no palpable axillary masses or adenopathy  CV: regular rate and rhythm, normal S1 S2, no S3 or S4, no murmur, click or rub, no peripheral edema and peripheral pulses strong  ABDOMEN: soft, nontender, no hepatosplenomegaly, no masses and bowel sounds normal   (female): normal female external genitalia, normal urethral meatus, vaginal mucosa pink, moist, well rugated, and normal cervix/adnexa/uterus without masses or discharge  MS: no gross musculoskeletal defects noted, no edema  SKIN: no suspicious lesions or rashes  NEURO: Normal strength and tone, mentation intact and speech normal  PSYCH: mentation appears normal, affect normal/bright    Diagnostic Test " "Results:  Labs reviewed in Epic    ASSESSMENT/PLAN:   1. Routine general medical examination at a health care facility  Pap today, if normal repeat in five years.      2. Encounter for surveillance of contraceptives, unspecified contraceptive  Denies risk factors for use.  Happy with this contraceptive method.   - desogestrel-ethinyl estradiol (APRI) 0.15-30 MG-MCG tablet; Take 1 tablet by mouth daily  Dispense: 84 tablet; Refill: 3    3. Screening for cervical cancer  - Gynecologic Cytology (PAP Smear); Future  - Gynecologic Cytology (PAP Smear)  - HPV High Risk Types DNA Cervical    Patient has been advised of split billing requirements and indicates understanding: Yes  COUNSELING:  Reviewed preventive health counseling, as reflected in patient instructions    Estimated body mass index is 19.26 kg/m  as calculated from the following:    Height as of this encounter: 1.702 m (5' 7\").    Weight as of this encounter: 55.8 kg (123 lb).        She reports that she has never smoked. She has never used smokeless tobacco.      Counseling Resources:  ATP IV Guidelines  Pooled Cohorts Equation Calculator  Breast Cancer Risk Calculator  BRCA-Related Cancer Risk Assessment: FHS-7 Tool  FRAX Risk Assessment  ICSI Preventive Guidelines  Dietary Guidelines for Americans, 2010  [employer]'s MyPlate  ASA Prophylaxis  Lung CA Screening    Shazia Caban CNP  [facility]  "

## 2021-12-15 LAB
HUMAN PAPILLOMA VIRUS 16 DNA: NEGATIVE
HUMAN PAPILLOMA VIRUS 18 DNA: NEGATIVE
HUMAN PAPILLOMA VIRUS FINAL DIAGNOSIS: NORMAL
HUMAN PAPILLOMA VIRUS OTHER HR: NEGATIVE

## 2021-12-20 LAB
BKR LAB AP GYN ADEQUACY: NORMAL
BKR LAB AP GYN INTERPRETATION: NORMAL
BKR LAB AP HPV REFLEX: NORMAL
BKR LAB AP LMP: NORMAL
BKR LAB AP PREVIOUS ABNORMAL: NORMAL
PATH REPORT.COMMENTS IMP SPEC: NORMAL
PATH REPORT.COMMENTS IMP SPEC: NORMAL
PATH REPORT.RELEVANT HX SPEC: NORMAL

## 2021-12-27 DIAGNOSIS — Z11.59 ENCOUNTER FOR SCREENING FOR OTHER VIRAL DISEASES: ICD-10-CM

## 2022-01-17 ENCOUNTER — OFFICE VISIT (OUTPATIENT)
Dept: FAMILY MEDICINE | Facility: CLINIC | Age: 32
End: 2022-01-17
Payer: COMMERCIAL

## 2022-01-17 VITALS
OXYGEN SATURATION: 99 % | DIASTOLIC BLOOD PRESSURE: 60 MMHG | HEART RATE: 83 BPM | SYSTOLIC BLOOD PRESSURE: 110 MMHG | WEIGHT: 122 LBS | BODY MASS INDEX: 19.11 KG/M2

## 2022-01-17 DIAGNOSIS — J33.9 NASAL POLYPOSIS: ICD-10-CM

## 2022-01-17 DIAGNOSIS — Z01.818 PREOP GENERAL PHYSICAL EXAM: Primary | ICD-10-CM

## 2022-01-17 DIAGNOSIS — F41.9 ACUTE ANXIETY: ICD-10-CM

## 2022-01-17 DIAGNOSIS — R07.81 RIB PAIN ON LEFT SIDE: ICD-10-CM

## 2022-01-17 LAB
HCG UR QL: NEGATIVE
HGB BLD-MCNC: 13.5 G/DL (ref 11.7–15.7)

## 2022-01-17 PROCEDURE — 36415 COLL VENOUS BLD VENIPUNCTURE: CPT | Performed by: NURSE PRACTITIONER

## 2022-01-17 PROCEDURE — 85018 HEMOGLOBIN: CPT | Performed by: NURSE PRACTITIONER

## 2022-01-17 PROCEDURE — 99214 OFFICE O/P EST MOD 30 MIN: CPT | Performed by: NURSE PRACTITIONER

## 2022-01-17 PROCEDURE — 81025 URINE PREGNANCY TEST: CPT | Performed by: NURSE PRACTITIONER

## 2022-01-17 RX ORDER — LORAZEPAM 0.5 MG/1
0.5 TABLET ORAL EVERY 6 HOURS PRN
Qty: 15 TABLET | Refills: 0 | Status: SHIPPED | OUTPATIENT
Start: 2022-01-17 | End: 2022-11-22

## 2022-01-17 NOTE — PROGRESS NOTES
[facility]  [address] [place] [address]  Phone: [phone]  Fax: [phone]  Primary Provider: Jolly Rebolledo  Pre-op Performing Provider: JOLLY REBOLLEDO      PREOPERATIVE EVALUATION:  Today's date: 1/17/2022    Harley Quarles is a 31 year old female who presents for a preoperative evaluation.    Surgical Information:  Surgery/Procedure: nasal polypectomy  Surgery Location: [facility]  Surgeon: Jayna Boykin  Surgery Date: 1/25  Time of Surgery: 6:30 am  Where patient plans to recover: At home with family  Fax number for surgical facility: Note does not need to be faxed, will be available electronically in Epic.    Type of Anesthesia Anticipated: General    Assessment & Plan     The proposed surgical procedure is considered LOW risk.    1. Preop general physical exam  - Hemoglobin; Future  - HCG qualitative urine; Future  - Hemoglobin  - HCG qualitative urine    2. Nasal polyposis    3. Acute anxiety  Rare use of lorazepam.  Advised she may take this the morning of surgery as needed but to notify her surgical team when she checks in if she decides to do so.   - LORazepam (ATIVAN) 0.5 MG tablet; Take 1 tablet (0.5 mg) by mouth every 6 hours as needed for anxiety  Dispense: 15 tablet; Refill: 0    4. Rib pain on left side  Left sided pain has been improving.  We did a full breast exam last month that was normal and pain was felt to be more muscular. Advised trial of NSAIDs, heat. If pain does not continue to improve, follow up.          Risks and Recommendations:  The patient has the following additional risks and recommendations for perioperative complications:   - No identified additional risk factors other than previously addressed    Medication Instructions:  Patient is to take all scheduled medications on the day of surgery    RECOMMENDATION:  APPROVAL GIVEN to proceed with proposed procedure, without further diagnostic  evaluation.          Subjective     HPI related to upcoming procedure:     Will be undergoing nasal polypectomy for chronic congestion.     She is otherwise healthy.  History notable for anxiety, otherwise negative.     At her physical in December she noted some left sided chest pain that seemed muscular in nature.  Over the left ribs. Positional.  She has noted this when she lies on her side and the breast falls to the center of the chest but states the pain is not within the breast.  Normal breast exam at that time. She feels this may radiate from the back.  Has not tried heat, NSAIDs.  She does feel the pain is much improved.  Not resolved but improved.      Preop Questions 1/14/2022   1. Have you ever had a heart attack or stroke? No   2. Have you ever had surgery on your heart or blood vessels, such as a stent placement, a coronary artery bypass, or surgery on an artery in your head, neck, heart, or legs? No   3. Do you have chest pain with activity? No   4. Do you have a history of  heart failure? No   5. Do you currently have a cold, bronchitis or symptoms of other infection? No   6. Do you have a cough, shortness of breath, or wheezing? No   7. Do you or anyone in your family have previous history of blood clots? No   8. Do you or does anyone in your family have a serious bleeding problem such as prolonged bleeding following surgeries or cuts? No   9. Have you ever had problems with anemia or been told to take iron pills? No   10. Have you had any abnormal blood loss such as black, tarry or bloody stools, or abnormal vaginal bleeding? No   11. Have you ever had a blood transfusion? No   12. Are you willing to have a blood transfusion if it is medically needed before, during, or after your surgery? Yes   13. Have you or any of your relatives ever had problems with anesthesia? No   14. Do you have sleep apnea, excessive snoring or daytime drowsiness? No   15. Do you have any artifical heart valves or other  implanted medical devices like a pacemaker, defibrillator, or continuous glucose monitor? No   16. Do you have artificial joints? No   17. Are you allergic to latex? No   18. Is there any chance that you may be pregnant? UNKNOWN - due for LMP       Health Care Directive:  Patient does not have a Health Care Directive or Living Will: Discussed advance care planning with patient; information given to patient to review.    Preoperative Review of :   reviewed - controlled substances reflected in medication list.      Status of Chronic Conditions:  See problem list for active medical problems.  Problems all longstanding and stable, except as noted/documented.  See ROS for pertinent symptoms related to these conditions.      Review of Systems  CONSTITUTIONAL: NEGATIVE for fever, chills, change in weight  INTEGUMENTARY/SKIN: NEGATIVE for worrisome rashes, moles or lesions  EYES: NEGATIVE for vision changes or irritation  ENT/MOUTH: NEGATIVE for ear, mouth and throat problems  RESP: NEGATIVE for significant cough or SOB  CV: NEGATIVE for chest pain, palpitations or peripheral edema  GI: NEGATIVE for nausea, abdominal pain, heartburn, or change in bowel habits  : NEGATIVE for frequency, dysuria, or hematuria  MUSCULOSKELETAL: NEGATIVE for significant arthralgias or myalgia  NEURO: NEGATIVE for weakness, dizziness or paresthesias  ENDOCRINE: NEGATIVE for temperature intolerance, skin/hair changes  HEME: NEGATIVE for bleeding problems  PSYCHIATRIC: NEGATIVE for changes in mood or affect    Patient Active Problem List    Diagnosis Date Noted     Nasal polyp 08/26/2021     Priority: Medium     Added automatically from request for surgery [id number]       ABI (generalized anxiety disorder) 10/29/2018     Priority: Medium     History of herpes genitalis 10/29/2018     Priority: Medium     Allergic rhinitis 10/29/2018     Priority: Medium      No past medical history on file.  No past surgical history on file.  Current  Outpatient Medications   Medication Sig Dispense Refill     desogestrel-ethinyl estradiol (APRI) 0.15-30 MG-MCG tablet Take 1 tablet by mouth daily 84 tablet 3     fluticasone propionate (FLONASE) 50 mcg/actuation nasal spray [FLUTICASONE PROPIONATE (FLONASE) 50 MCG/ACTUATION NASAL SPRAY] INSTILL 1 SPRAY INTO EACH NOSTRIL TWICE A DAY 48 g 3     loratadine (CLARITIN) 10 MG tablet Take 10 mg by mouth daily       LORazepam (ATIVAN) 0.5 MG tablet [LORAZEPAM (ATIVAN) 0.5 MG TABLET] Take 1 tablet (0.5 mg total) by mouth every 6 (six) hours as needed for anxiety. 30 tablet 0     valACYclovir (VALTREX) 500 MG tablet Take 1 tablet (500 mg) by mouth 2 times daily for 3 days 6 tablet 3       Allergies   Allergen Reactions     Amoxicillin Unknown     Nitrofurantoin Rash     Very small area of arm     Other Environmental Allergy Other (See Comments)     Stuffy nose,itchy eyes     Penicillins Rash        Social History     Tobacco Use     Smoking status: Never Smoker     Smokeless tobacco: Never Used   Substance Use Topics     Alcohol use: Yes     Family History   Problem Relation Age of Onset     Cancer Maternal Grandfather      Mental Illness Paternal Grandfather      Prostate Cancer Paternal Grandfather      History   Drug Use No         Objective     /60   Pulse 83   Wt 55.3 kg (122 lb)   SpO2 99%   BMI 19.11 kg/m      Physical Exam    GENERAL APPEARANCE: healthy, alert and no distress     EYES: EOMI, PERRL     HENT: ear canals and TM's normal and nose and mouth without ulcers or lesions     NECK: no adenopathy, no asymmetry, masses, or scars and thyroid normal to palpation     RESP: lungs clear to auscultation - no rales, rhonchi or wheezes     CV: regular rates and rhythm, normal S1 S2, no S3 or S4 and no murmur, click or rub     ABDOMEN:  soft, nontender, no HSM or masses and bowel sounds normal     MS: extremities normal- no gross deformities noted, no evidence of inflammation in joints, FROM in all  extremities.     SKIN: no suspicious lesions or rashes     NEURO: Normal strength and tone, sensory exam grossly normal, mentation intact and speech normal     PSYCH: mentation appears normal. and affect normal/bright     LYMPHATICS: No cervical adenopathy    No results for input(s): HGB, PLT, INR, NA, POTASSIUM, CR, A1C in the last 19566 hours.     Diagnostics:  Recent Results (from the past 24 hour(s))   Hemoglobin    Collection Time: 01/17/22  1:33 PM   Result Value Ref Range    Hemoglobin 13.5 11.7 - 15.7 g/dL   HCG qualitative urine    Collection Time: 01/17/22  1:33 PM   Result Value Ref Range    hCG Urine Qualitative Negative Negative      No EKG required, no history of coronary heart disease, significant arrhythmia, peripheral arterial disease or other structural heart disease.    Revised Cardiac Risk Index (RCRI):  The patient has the following serious cardiovascular risks for perioperative complications:   - No serious cardiac risks = 0 points     RCRI Interpretation: 0 points: Class I (very low risk - 0.4% complication rate)           Signed Electronically by: Shazia Caban CNP  Copy of this evaluation report is provided to requesting physician.

## 2022-01-17 NOTE — H&P (VIEW-ONLY)
[facility]  [address] [place] [address]  Phone: [phone]  Fax: [phone]  Primary Provider: Jolly Rebolledo  Pre-op Performing Provider: JOLLY REBOLLEDO      PREOPERATIVE EVALUATION:  Today's date: 1/17/2022    Harley Quarles is a 31 year old female who presents for a preoperative evaluation.    Surgical Information:  Surgery/Procedure: nasal polypectomy  Surgery Location: [facility]  Surgeon: Jayna Boykin  Surgery Date: 1/25  Time of Surgery: 6:30 am  Where patient plans to recover: At home with family  Fax number for surgical facility: Note does not need to be faxed, will be available electronically in Epic.    Type of Anesthesia Anticipated: General    Assessment & Plan     The proposed surgical procedure is considered LOW risk.    1. Preop general physical exam  - Hemoglobin; Future  - HCG qualitative urine; Future  - Hemoglobin  - HCG qualitative urine    2. Nasal polyposis    3. Acute anxiety  Rare use of lorazepam.  Advised she may take this the morning of surgery as needed but to notify her surgical team when she checks in if she decides to do so.   - LORazepam (ATIVAN) 0.5 MG tablet; Take 1 tablet (0.5 mg) by mouth every 6 hours as needed for anxiety  Dispense: 15 tablet; Refill: 0    4. Rib pain on left side  Left sided pain has been improving.  We did a full breast exam last month that was normal and pain was felt to be more muscular. Advised trial of NSAIDs, heat. If pain does not continue to improve, follow up.          Risks and Recommendations:  The patient has the following additional risks and recommendations for perioperative complications:   - No identified additional risk factors other than previously addressed    Medication Instructions:  Patient is to take all scheduled medications on the day of surgery    RECOMMENDATION:  APPROVAL GIVEN to proceed with proposed procedure, without further diagnostic  evaluation.          Subjective     HPI related to upcoming procedure:     Will be undergoing nasal polypectomy for chronic congestion.     She is otherwise healthy.  History notable for anxiety, otherwise negative.     At her physical in December she noted some left sided chest pain that seemed muscular in nature.  Over the left ribs. Positional.  She has noted this when she lies on her side and the breast falls to the center of the chest but states the pain is not within the breast.  Normal breast exam at that time. She feels this may radiate from the back.  Has not tried heat, NSAIDs.  She does feel the pain is much improved.  Not resolved but improved.      Preop Questions 1/14/2022   1. Have you ever had a heart attack or stroke? No   2. Have you ever had surgery on your heart or blood vessels, such as a stent placement, a coronary artery bypass, or surgery on an artery in your head, neck, heart, or legs? No   3. Do you have chest pain with activity? No   4. Do you have a history of  heart failure? No   5. Do you currently have a cold, bronchitis or symptoms of other infection? No   6. Do you have a cough, shortness of breath, or wheezing? No   7. Do you or anyone in your family have previous history of blood clots? No   8. Do you or does anyone in your family have a serious bleeding problem such as prolonged bleeding following surgeries or cuts? No   9. Have you ever had problems with anemia or been told to take iron pills? No   10. Have you had any abnormal blood loss such as black, tarry or bloody stools, or abnormal vaginal bleeding? No   11. Have you ever had a blood transfusion? No   12. Are you willing to have a blood transfusion if it is medically needed before, during, or after your surgery? Yes   13. Have you or any of your relatives ever had problems with anesthesia? No   14. Do you have sleep apnea, excessive snoring or daytime drowsiness? No   15. Do you have any artifical heart valves or other  implanted medical devices like a pacemaker, defibrillator, or continuous glucose monitor? No   16. Do you have artificial joints? No   17. Are you allergic to latex? No   18. Is there any chance that you may be pregnant? UNKNOWN - due for LMP       Health Care Directive:  Patient does not have a Health Care Directive or Living Will: Discussed advance care planning with patient; information given to patient to review.    Preoperative Review of :   reviewed - controlled substances reflected in medication list.      Status of Chronic Conditions:  See problem list for active medical problems.  Problems all longstanding and stable, except as noted/documented.  See ROS for pertinent symptoms related to these conditions.      Review of Systems  CONSTITUTIONAL: NEGATIVE for fever, chills, change in weight  INTEGUMENTARY/SKIN: NEGATIVE for worrisome rashes, moles or lesions  EYES: NEGATIVE for vision changes or irritation  ENT/MOUTH: NEGATIVE for ear, mouth and throat problems  RESP: NEGATIVE for significant cough or SOB  CV: NEGATIVE for chest pain, palpitations or peripheral edema  GI: NEGATIVE for nausea, abdominal pain, heartburn, or change in bowel habits  : NEGATIVE for frequency, dysuria, or hematuria  MUSCULOSKELETAL: NEGATIVE for significant arthralgias or myalgia  NEURO: NEGATIVE for weakness, dizziness or paresthesias  ENDOCRINE: NEGATIVE for temperature intolerance, skin/hair changes  HEME: NEGATIVE for bleeding problems  PSYCHIATRIC: NEGATIVE for changes in mood or affect    Patient Active Problem List    Diagnosis Date Noted     Nasal polyp 08/26/2021     Priority: Medium     Added automatically from request for surgery [id number]       ABI (generalized anxiety disorder) 10/29/2018     Priority: Medium     History of herpes genitalis 10/29/2018     Priority: Medium     Allergic rhinitis 10/29/2018     Priority: Medium      No past medical history on file.  No past surgical history on file.  Current  Outpatient Medications   Medication Sig Dispense Refill     desogestrel-ethinyl estradiol (APRI) 0.15-30 MG-MCG tablet Take 1 tablet by mouth daily 84 tablet 3     fluticasone propionate (FLONASE) 50 mcg/actuation nasal spray [FLUTICASONE PROPIONATE (FLONASE) 50 MCG/ACTUATION NASAL SPRAY] INSTILL 1 SPRAY INTO EACH NOSTRIL TWICE A DAY 48 g 3     loratadine (CLARITIN) 10 MG tablet Take 10 mg by mouth daily       LORazepam (ATIVAN) 0.5 MG tablet [LORAZEPAM (ATIVAN) 0.5 MG TABLET] Take 1 tablet (0.5 mg total) by mouth every 6 (six) hours as needed for anxiety. 30 tablet 0     valACYclovir (VALTREX) 500 MG tablet Take 1 tablet (500 mg) by mouth 2 times daily for 3 days 6 tablet 3       Allergies   Allergen Reactions     Amoxicillin Unknown     Nitrofurantoin Rash     Very small area of arm     Other Environmental Allergy Other (See Comments)     Stuffy nose,itchy eyes     Penicillins Rash        Social History     Tobacco Use     Smoking status: Never Smoker     Smokeless tobacco: Never Used   Substance Use Topics     Alcohol use: Yes     Family History   Problem Relation Age of Onset     Cancer Maternal Grandfather      Mental Illness Paternal Grandfather      Prostate Cancer Paternal Grandfather      History   Drug Use No         Objective     /60   Pulse 83   Wt 55.3 kg (122 lb)   SpO2 99%   BMI 19.11 kg/m      Physical Exam    GENERAL APPEARANCE: healthy, alert and no distress     EYES: EOMI, PERRL     HENT: ear canals and TM's normal and nose and mouth without ulcers or lesions     NECK: no adenopathy, no asymmetry, masses, or scars and thyroid normal to palpation     RESP: lungs clear to auscultation - no rales, rhonchi or wheezes     CV: regular rates and rhythm, normal S1 S2, no S3 or S4 and no murmur, click or rub     ABDOMEN:  soft, nontender, no HSM or masses and bowel sounds normal     MS: extremities normal- no gross deformities noted, no evidence of inflammation in joints, FROM in all  extremities.     SKIN: no suspicious lesions or rashes     NEURO: Normal strength and tone, sensory exam grossly normal, mentation intact and speech normal     PSYCH: mentation appears normal. and affect normal/bright     LYMPHATICS: No cervical adenopathy    No results for input(s): HGB, PLT, INR, NA, POTASSIUM, CR, A1C in the last 13327 hours.     Diagnostics:  Recent Results (from the past 24 hour(s))   Hemoglobin    Collection Time: 01/17/22  1:33 PM   Result Value Ref Range    Hemoglobin 13.5 11.7 - 15.7 g/dL   HCG qualitative urine    Collection Time: 01/17/22  1:33 PM   Result Value Ref Range    hCG Urine Qualitative Negative Negative      No EKG required, no history of coronary heart disease, significant arrhythmia, peripheral arterial disease or other structural heart disease.    Revised Cardiac Risk Index (RCRI):  The patient has the following serious cardiovascular risks for perioperative complications:   - No serious cardiac risks = 0 points     RCRI Interpretation: 0 points: Class I (very low risk - 0.4% complication rate)           Signed Electronically by: Shazia Caban CNP  Copy of this evaluation report is provided to requesting physician.

## 2022-01-20 RX ORDER — METHYLPREDNISOLONE 4 MG
TABLET, DOSE PACK ORAL SEE ADMIN INSTRUCTIONS
COMMUNITY
End: 2022-05-18

## 2022-01-20 ASSESSMENT — MIFFLIN-ST. JEOR: SCORE: 1301.02

## 2022-01-21 ENCOUNTER — LAB (OUTPATIENT)
Dept: LAB | Facility: CLINIC | Age: 32
End: 2022-01-21
Payer: COMMERCIAL

## 2022-01-21 DIAGNOSIS — Z11.59 ENCOUNTER FOR SCREENING FOR OTHER VIRAL DISEASES: ICD-10-CM

## 2022-01-21 PROCEDURE — U0003 INFECTIOUS AGENT DETECTION BY NUCLEIC ACID (DNA OR RNA); SEVERE ACUTE RESPIRATORY SYNDROME CORONAVIRUS 2 (SARS-COV-2) (CORONAVIRUS DISEASE [COVID-19]), AMPLIFIED PROBE TECHNIQUE, MAKING USE OF HIGH THROUGHPUT TECHNOLOGIES AS DESCRIBED BY CMS-2020-01-R: HCPCS

## 2022-01-21 PROCEDURE — U0005 INFEC AGEN DETEC AMPLI PROBE: HCPCS

## 2022-01-22 LAB — SARS-COV-2 RNA RESP QL NAA+PROBE: NEGATIVE

## 2022-01-24 ENCOUNTER — ANESTHESIA EVENT (OUTPATIENT)
Dept: SURGERY | Facility: AMBULATORY SURGERY CENTER | Age: 32
End: 2022-01-24
Payer: COMMERCIAL

## 2022-01-25 ENCOUNTER — HOSPITAL ENCOUNTER (OUTPATIENT)
Facility: AMBULATORY SURGERY CENTER | Age: 32
End: 2022-01-25
Attending: OTOLARYNGOLOGY
Payer: COMMERCIAL

## 2022-01-25 ENCOUNTER — ANESTHESIA (OUTPATIENT)
Dept: SURGERY | Facility: AMBULATORY SURGERY CENTER | Age: 32
End: 2022-01-25
Payer: COMMERCIAL

## 2022-01-25 VITALS
OXYGEN SATURATION: 99 % | TEMPERATURE: 97.1 F | WEIGHT: 122 LBS | HEIGHT: 67 IN | HEART RATE: 67 BPM | RESPIRATION RATE: 16 BRPM | BODY MASS INDEX: 19.15 KG/M2 | SYSTOLIC BLOOD PRESSURE: 142 MMHG | DIASTOLIC BLOOD PRESSURE: 94 MMHG

## 2022-01-25 DIAGNOSIS — J33.9 NASAL POLYP: Primary | ICD-10-CM

## 2022-01-25 PROCEDURE — 31253 NSL/SINS NDSC TOTAL: CPT | Mod: LT | Performed by: OTOLARYNGOLOGY

## 2022-01-25 PROCEDURE — 31267 ENDOSCOPY MAXILLARY SINUS: CPT | Mod: 50 | Performed by: OTOLARYNGOLOGY

## 2022-01-25 PROCEDURE — 31255 NSL/SINS NDSC W/TOT ETHMDCT: CPT | Mod: 59 | Performed by: OTOLARYNGOLOGY

## 2022-01-25 DEVICE — IMPLANTABLE DEVICE: Type: IMPLANTABLE DEVICE | Site: NOSE | Status: FUNCTIONAL

## 2022-01-25 RX ORDER — ONDANSETRON 2 MG/ML
4 INJECTION INTRAMUSCULAR; INTRAVENOUS EVERY 30 MIN PRN
Status: DISCONTINUED | OUTPATIENT
Start: 2022-01-25 | End: 2022-01-26 | Stop reason: HOSPADM

## 2022-01-25 RX ORDER — LIDOCAINE HYDROCHLORIDE AND EPINEPHRINE 10; 10 MG/ML; UG/ML
INJECTION, SOLUTION INFILTRATION; PERINEURAL PRN
Status: DISCONTINUED | OUTPATIENT
Start: 2022-01-25 | End: 2022-01-25 | Stop reason: HOSPADM

## 2022-01-25 RX ORDER — OXYCODONE HYDROCHLORIDE 5 MG/1
5 TABLET ORAL EVERY 4 HOURS PRN
Status: DISCONTINUED | OUTPATIENT
Start: 2022-01-25 | End: 2022-01-26 | Stop reason: HOSPADM

## 2022-01-25 RX ORDER — FENTANYL CITRATE 50 UG/ML
25 INJECTION, SOLUTION INTRAMUSCULAR; INTRAVENOUS
Status: DISCONTINUED | OUTPATIENT
Start: 2022-01-25 | End: 2022-01-26 | Stop reason: HOSPADM

## 2022-01-25 RX ORDER — OXYMETAZOLINE HYDROCHLORIDE 0.05 G/100ML
SPRAY NASAL PRN
Status: DISCONTINUED | OUTPATIENT
Start: 2022-01-25 | End: 2022-01-25 | Stop reason: HOSPADM

## 2022-01-25 RX ORDER — ONDANSETRON 2 MG/ML
INJECTION INTRAMUSCULAR; INTRAVENOUS PRN
Status: DISCONTINUED | OUTPATIENT
Start: 2022-01-25 | End: 2022-01-25

## 2022-01-25 RX ORDER — FENTANYL CITRATE 50 UG/ML
25 INJECTION, SOLUTION INTRAMUSCULAR; INTRAVENOUS EVERY 5 MIN PRN
Status: DISCONTINUED | OUTPATIENT
Start: 2022-01-25 | End: 2022-01-26 | Stop reason: HOSPADM

## 2022-01-25 RX ORDER — DEXAMETHASONE SODIUM PHOSPHATE 4 MG/ML
INJECTION, SOLUTION INTRA-ARTICULAR; INTRALESIONAL; INTRAMUSCULAR; INTRAVENOUS; SOFT TISSUE PRN
Status: DISCONTINUED | OUTPATIENT
Start: 2022-01-25 | End: 2022-01-25

## 2022-01-25 RX ORDER — IBUPROFEN 600 MG/1
600 TABLET, FILM COATED ORAL EVERY 6 HOURS PRN
Qty: 20 TABLET | Refills: 0 | Status: SHIPPED | OUTPATIENT
Start: 2022-01-25 | End: 2022-01-30

## 2022-01-25 RX ORDER — PROPOFOL 10 MG/ML
INJECTION, EMULSION INTRAVENOUS PRN
Status: DISCONTINUED | OUTPATIENT
Start: 2022-01-25 | End: 2022-01-25

## 2022-01-25 RX ORDER — HYDROMORPHONE HCL IN WATER/PF 6 MG/30 ML
0.2 PATIENT CONTROLLED ANALGESIA SYRINGE INTRAVENOUS EVERY 5 MIN PRN
Status: DISCONTINUED | OUTPATIENT
Start: 2022-01-25 | End: 2022-01-26 | Stop reason: HOSPADM

## 2022-01-25 RX ORDER — DEXAMETHASONE SODIUM PHOSPHATE 10 MG/ML
10 INJECTION, SOLUTION INTRAMUSCULAR; INTRAVENOUS ONCE
Status: DISCONTINUED | OUTPATIENT
Start: 2022-01-25 | End: 2022-01-26 | Stop reason: HOSPADM

## 2022-01-25 RX ORDER — LABETALOL HYDROCHLORIDE 5 MG/ML
INJECTION, SOLUTION INTRAVENOUS PRN
Status: DISCONTINUED | OUTPATIENT
Start: 2022-01-25 | End: 2022-01-25

## 2022-01-25 RX ORDER — SODIUM CHLORIDE, SODIUM LACTATE, POTASSIUM CHLORIDE, CALCIUM CHLORIDE 600; 310; 30; 20 MG/100ML; MG/100ML; MG/100ML; MG/100ML
INJECTION, SOLUTION INTRAVENOUS CONTINUOUS
Status: DISCONTINUED | OUTPATIENT
Start: 2022-01-25 | End: 2022-01-26 | Stop reason: HOSPADM

## 2022-01-25 RX ORDER — IBUPROFEN 600 MG/1
600 TABLET, FILM COATED ORAL
Status: DISCONTINUED | OUTPATIENT
Start: 2022-01-25 | End: 2022-01-26 | Stop reason: HOSPADM

## 2022-01-25 RX ORDER — MEPERIDINE HYDROCHLORIDE 25 MG/ML
12.5 INJECTION INTRAMUSCULAR; INTRAVENOUS; SUBCUTANEOUS
Status: DISCONTINUED | OUTPATIENT
Start: 2022-01-25 | End: 2022-01-26 | Stop reason: HOSPADM

## 2022-01-25 RX ORDER — LIDOCAINE 40 MG/G
CREAM TOPICAL
Status: DISCONTINUED | OUTPATIENT
Start: 2022-01-25 | End: 2022-01-26 | Stop reason: HOSPADM

## 2022-01-25 RX ORDER — ACETAMINOPHEN 325 MG/1
975 TABLET ORAL ONCE
Status: COMPLETED | OUTPATIENT
Start: 2022-01-25 | End: 2022-01-25

## 2022-01-25 RX ORDER — ONDANSETRON 4 MG/1
4 TABLET, FILM COATED ORAL EVERY 8 HOURS PRN
Qty: 15 TABLET | Refills: 0 | Status: SHIPPED | OUTPATIENT
Start: 2022-01-25 | End: 2022-01-30

## 2022-01-25 RX ORDER — PROPOFOL 10 MG/ML
INJECTION, EMULSION INTRAVENOUS CONTINUOUS PRN
Status: DISCONTINUED | OUTPATIENT
Start: 2022-01-25 | End: 2022-01-25

## 2022-01-25 RX ORDER — LIDOCAINE HYDROCHLORIDE 20 MG/ML
INJECTION, SOLUTION INFILTRATION; PERINEURAL PRN
Status: DISCONTINUED | OUTPATIENT
Start: 2022-01-25 | End: 2022-01-25

## 2022-01-25 RX ORDER — CLARITHROMYCIN 250 MG/1
250 TABLET, FILM COATED ORAL 2 TIMES DAILY
Qty: 10 TABLET | Refills: 0 | Status: SHIPPED | OUTPATIENT
Start: 2022-01-25 | End: 2022-01-30

## 2022-01-25 RX ORDER — CLINDAMYCIN PHOSPHATE 900 MG/50ML
900 INJECTION, SOLUTION INTRAVENOUS
Status: COMPLETED | OUTPATIENT
Start: 2022-01-25 | End: 2022-01-25

## 2022-01-25 RX ORDER — ONDANSETRON 4 MG/1
4 TABLET, ORALLY DISINTEGRATING ORAL EVERY 30 MIN PRN
Status: DISCONTINUED | OUTPATIENT
Start: 2022-01-25 | End: 2022-01-26 | Stop reason: HOSPADM

## 2022-01-25 RX ORDER — TRAMADOL HYDROCHLORIDE 50 MG/1
50 TABLET ORAL EVERY 6 HOURS PRN
Qty: 10 TABLET | Refills: 0 | Status: SHIPPED | OUTPATIENT
Start: 2022-01-25 | End: 2022-01-28

## 2022-01-25 RX ORDER — GLYCOPYRROLATE 0.2 MG/ML
INJECTION, SOLUTION INTRAMUSCULAR; INTRAVENOUS PRN
Status: DISCONTINUED | OUTPATIENT
Start: 2022-01-25 | End: 2022-01-25

## 2022-01-25 RX ORDER — FENTANYL CITRATE 50 UG/ML
INJECTION, SOLUTION INTRAMUSCULAR; INTRAVENOUS PRN
Status: DISCONTINUED | OUTPATIENT
Start: 2022-01-25 | End: 2022-01-25

## 2022-01-25 RX ORDER — MAGNESIUM SULFATE HEPTAHYDRATE 40 MG/ML
4 INJECTION, SOLUTION INTRAVENOUS ONCE
Status: COMPLETED | OUTPATIENT
Start: 2022-01-25 | End: 2022-01-25

## 2022-01-25 RX ORDER — NEOSTIGMINE METHYLSULFATE 1 MG/ML
VIAL (ML) INJECTION PRN
Status: DISCONTINUED | OUTPATIENT
Start: 2022-01-25 | End: 2022-01-25

## 2022-01-25 RX ADMIN — Medication 25 MG: at 09:09

## 2022-01-25 RX ADMIN — LIDOCAINE HYDROCHLORIDE 60 MG: 20 INJECTION, SOLUTION INFILTRATION; PERINEURAL at 09:09

## 2022-01-25 RX ADMIN — GLYCOPYRROLATE 0.2 MG: 0.2 INJECTION, SOLUTION INTRAMUSCULAR; INTRAVENOUS at 09:09

## 2022-01-25 RX ADMIN — CLINDAMYCIN PHOSPHATE 900 MG: 900 INJECTION, SOLUTION INTRAVENOUS at 08:53

## 2022-01-25 RX ADMIN — LABETALOL HYDROCHLORIDE 5 MG: 5 INJECTION, SOLUTION INTRAVENOUS at 09:37

## 2022-01-25 RX ADMIN — DEXAMETHASONE SODIUM PHOSPHATE 10 MG: 4 INJECTION, SOLUTION INTRA-ARTICULAR; INTRALESIONAL; INTRAMUSCULAR; INTRAVENOUS; SOFT TISSUE at 09:09

## 2022-01-25 RX ADMIN — FENTANYL CITRATE 25 MCG: 50 INJECTION, SOLUTION INTRAMUSCULAR; INTRAVENOUS at 10:48

## 2022-01-25 RX ADMIN — SODIUM CHLORIDE, SODIUM LACTATE, POTASSIUM CHLORIDE, CALCIUM CHLORIDE: 600; 310; 30; 20 INJECTION, SOLUTION INTRAVENOUS at 08:52

## 2022-01-25 RX ADMIN — MAGNESIUM SULFATE HEPTAHYDRATE 4 G: 40 INJECTION, SOLUTION INTRAVENOUS at 08:55

## 2022-01-25 RX ADMIN — FENTANYL CITRATE 100 MCG: 50 INJECTION, SOLUTION INTRAMUSCULAR; INTRAVENOUS at 09:09

## 2022-01-25 RX ADMIN — FENTANYL CITRATE 25 MCG: 50 INJECTION, SOLUTION INTRAMUSCULAR; INTRAVENOUS at 10:39

## 2022-01-25 RX ADMIN — PROPOFOL 200 MCG/KG/MIN: 10 INJECTION, EMULSION INTRAVENOUS at 09:11

## 2022-01-25 RX ADMIN — OXYCODONE HYDROCHLORIDE 5 MG: 5 TABLET ORAL at 12:14

## 2022-01-25 RX ADMIN — LABETALOL HYDROCHLORIDE 5 MG: 5 INJECTION, SOLUTION INTRAVENOUS at 09:49

## 2022-01-25 RX ADMIN — Medication 2.5 MG: at 09:58

## 2022-01-25 RX ADMIN — ACETAMINOPHEN 975 MG: 325 TABLET ORAL at 08:52

## 2022-01-25 RX ADMIN — ONDANSETRON 4 MG: 2 INJECTION INTRAMUSCULAR; INTRAVENOUS at 09:58

## 2022-01-25 RX ADMIN — PROPOFOL 200 MG: 10 INJECTION, EMULSION INTRAVENOUS at 09:09

## 2022-01-25 RX ADMIN — FENTANYL CITRATE 25 MCG: 50 INJECTION, SOLUTION INTRAMUSCULAR; INTRAVENOUS at 10:28

## 2022-01-25 RX ADMIN — LABETALOL HYDROCHLORIDE 5 MG: 5 INJECTION, SOLUTION INTRAVENOUS at 09:42

## 2022-01-25 RX ADMIN — FENTANYL CITRATE 25 MCG: 50 INJECTION, SOLUTION INTRAMUSCULAR; INTRAVENOUS at 10:20

## 2022-01-25 RX ADMIN — GLYCOPYRROLATE 0.4 MG: 0.2 INJECTION, SOLUTION INTRAMUSCULAR; INTRAVENOUS at 09:58

## 2022-01-25 RX ADMIN — ONDANSETRON 4 MG: 2 INJECTION INTRAMUSCULAR; INTRAVENOUS at 12:02

## 2022-01-25 ASSESSMENT — MIFFLIN-ST. JEOR: SCORE: 1301.14

## 2022-01-25 NOTE — OP NOTE
Otolaryngology Full Operative Report    Date of Operation:  1/25/22    Pre-operative Diagnosis:  Bilateral sinonasal polyposis  Post-operative Diagnosis:  same  Procedure(s):  Bilateral functional endoscopic sinus surgery including maxillary antrostomies with tissue removal, total ethmoidectomies, left frontal sinusotomy    Surgeon: Jayna Boykin MD  Assistant(s):  None  Anesthesia:  GET    Indications for Procedure:  Harley is a 32yo F with sinonasal polyposis. The risks and the benefits of the procedure were discussed with the patient. A consent form was then signed and placed into the chart.    Procedure in Detail:  The patient was brought into the operating room and placed on the table in a supine position. Anesthesia intubated without any difficulties. A time out was performed with all pertinent personnel in the room. The bed was then rotated 90 degrees. Anterior rhinoscopy was performed and afrin-soaked nasal pledgets were placed. The patient was then prepped and draped in the usual fashion.     The nasal pledgets were removed from the nose and a 0 degree rigid nasal endoscope was used to inspect both nasal passages. Both sides were notable for obstructive, inflammatory polyps. 3cc of 1% lidocaine with 1:100,000 epinephrine was injected into the anterior attachment of the middle turbinates bilaterally.    Attention was turned to the left side first. A freer elevator was used to medialize the middle turbinate, and a Dixie was used to incise the uncinate process and remove it.  A microdebrider was introduced to remove the polyps hanging in the nose. Then, a ball-ended seeker was used to palpate for the natural maxillary sinus os and enlarge it. A microdebrider was used to finish performing the maxillary antrostomy. The contents of the sinus were suctioned and irrigated with normal saline, noting only polyps and inspissated mucus. The microdebrider was then used to enter the ethmoid bulla and systematically  take down the anterior and posterior ethmoid cells. The remaining ethmoid cells along the skull base and lamina papyracea were addressed with a curette. A curved suction was used to palpate the frontoethmoidal recess and enter it, noting no blockage. The surgical field was then irrigated and suctioned. Afrin-soaked pledgets were replaced into the nose and attention was turned to the right side. The antrostomy and ethmoidectomy  were performed in an identical fashion. Propel stents were placed into the operative fields bilaterally and hemostasis was achieved with Surgicel powder. An OG tube was passed through the oral cavity and advanced to the stomach to aspirate stomach and oropharyngeal fluids. A subnasal dressing was placed. The final OR counts were correct at the end of the case.       Findings:  Bilateral maxillary and ethmoid sinonasal polyposis    Specimen(s):  none    EBL:  20cc    Complications:  none    Disposition: The patient was extubated in the operating room and was transferred to the PACU in stable condition.     Jayna Boykin MD  [facility] [place]  [phone] (o)

## 2022-01-25 NOTE — PROGRESS NOTES
Offered patient pregnancy test.  Explained there are risks associated with anesthesia and pregnancy.  Patient verbalizes understanding, denies possibility of pregnancy and declines pregnancy test.    Jarad Crockett RN on 1/25/2022 at 9:00 AM

## 2022-01-25 NOTE — ANESTHESIA POSTPROCEDURE EVALUATION
Patient: Harley Quarles    Procedure: Procedure(s):  POLYPECTOMY, NASAL CAVITY, ENDOSCOPIC       Diagnosis:Nasal polyp [J33.9]  Diagnosis Additional Information: No value filed.    Anesthesia Type:  General    Note:  Disposition: Outpatient   Postop Pain Control: Uneventful            Sign Out: Well controlled pain   PONV: No   Neuro/Psych: Uneventful            Sign Out: Acceptable/Baseline neuro status   Airway/Respiratory: Uneventful            Sign Out: Acceptable/Baseline resp. status   CV/Hemodynamics: Uneventful            Sign Out: Acceptable CV status; No obvious hypovolemia; No obvious fluid overload   Other NRE: NONE   DID A NON-ROUTINE EVENT OCCUR? No           Last vitals:  Vitals Value Taken Time   /86 01/25/22 1106   Temp 97.1  F (36.2  C) 01/25/22 1106   Pulse 66 01/25/22 1102   Resp 16 01/25/22 1106   SpO2 98 % 01/25/22 1106   Vitals shown include unvalidated device data.    Electronically Signed By: Reji Holloway MD  January 25, 2022  12:26 PM

## 2022-01-25 NOTE — DISCHARGE INSTRUCTIONS
If you have any questions or concerns regarding your procedure, please contact Dr. Boykin, her office number is [phone].    Take tylenol and ibuprofen every 6 hours as your main form of pain control.     Acetaminophen (Tylenol): Next Dose: 2:52 pm. Take 650-1000 mg every 6 hours for mild to moderate pain.    Do not exceed 4,000 mg of acetaminophen during a 24 hour period and keep in mind that acetaminophen can also be found in many over-the-counter cold medications as well as narcotics that may be given for pain.     Ibuprofen (Motrin, Advil):  Take 600 mg every 6 hours for mild to moderate pain.    Tramadol: Next Dose: *** Take 1 tab every 6 hours for moderate to severe pain not controlled with tylenol and ibuprofen.    Antibiotics: You have been prescribed clarithromycin. Take this medication as instructed on the bottle. Make sure to take this medication until the pills are gone.    Senna-Docusate (Stool Softener): Next dose: When you get home. Take as instructed on the bottle. This is an over-the-counter medication. Take this while taking narcotic medications to prevent constipation.      Discharge Instructions: After Your Surgery    You ve just had surgery. During surgery, you were given medicine called anesthesia to keep you relaxed and free of pain. After surgery, you may have some pain or nausea. This is common. Here are some tips for feeling better and getting well after surgery.    Going home    Your healthcare provider will show you how to take care of yourself when you go home. He or she will also answer your questions. Have an adult family member or friend drive you home. For the first 24 hours after your surgery:    Don't drive or use heavy equipment.    Don't make important decisions or sign legal papers.    Don't drink alcohol.    Have someone stay with you for 24 hours. He or she can watch for problems and help keep you safe.  Be sure to go to all follow-up visits with your healthcare provider.  And rest after your surgery for as long as your healthcare provider tells you to.    Coping with pain    If you have pain after surgery, pain medicine will help you feel better. Take it as told, before pain becomes severe. Also, ask your healthcare provider or pharmacist about other ways to control pain. This might be with heat, ice, or relaxation. And follow any other instructions your surgeon or nurse gives you.    Tips for taking pain medicine    To get the best relief possible, remember these points:    Pain medicines can upset your stomach. Taking them with a little food may help.    Most pain relievers taken by mouth need at least 20 to 30 minutes to start to work.    Don't wait till your pain becomes severe before you take your medicine. Try to time your medicine so that you can take it before starting an activity. This might be before you get dressed, go for a walk, or sit down for dinner.    Constipation is a common side effect of pain medicines. It's ok to take medicines such as laxatives or stool softeners to help ease constipation. Also ask if you should skip any foods. Drinking lots of fluids and eating foods such as fruits and vegetables that are high in fiber can also help.    Drinking alcohol and taking pain medicine can cause dizziness and slow your breathing. It can even be deadly. Don't drink alcohol while taking pain medicine.    Pain medicine can make you react more slowly to things. Don't drive or run machinery while taking pain medicine.  Your healthcare provider may tell you to take acetaminophen to help ease your pain. Ask him or her how much you are supposed to take each day. Acetaminophen or other pain relievers may interact with your prescription medicines or other over-the-counter (OTC) medicines. Some prescription medicines have acetaminophen and other ingredients. Using both prescription and OTC acetaminophen for pain can cause you to overdose. Read the labels on your OTC medicines  with care. This will help you to clearly know the list of ingredients, how much to take, and any warnings. It may also help you not take too much acetaminophen. If you have questions or don't understand the information, ask your pharmacist or healthcare provider to explain it to you before you take the OTC medicine.    Managing nausea    Some people have an upset stomach after surgery. This is often because of anesthesia, pain, or pain medicine, or the stress of surgery. These tips will help you handle nausea and eat healthy foods as you get better. If you were on a special food plan before surgery, ask your healthcare provider if you should follow it while you get better. These tips may help:      Don't push yourself to eat. Your body will tell you when to eat and how much.    Start off with clear liquids and soup. They are easier to digest.    Next try semi-solid foods, such as mashed potatoes, applesauce, and gelatin, as you feel ready.    Slowly move to solid foods. Don t eat fatty, rich, or spicy foods at first.    Don't force yourself to have 3 large meals a day. Instead eat smaller amounts more often.    Take pain medicines with a small amount of solid food, such as crackers or toast, to prevent nausea.    When to call your healthcare provider    Call your healthcare provider if:    You still have intolerable pain an hour after taking medicine. The medicine may not be strong enough.    You feel too sleepy, dizzy, or groggy. The medicine may be too strong.    You have side effects such as nausea or vomiting, or skin changes such as rash, itching, or hives. Your healthcare provider may suggest other medicines to control side effects.  Rash, itching, or hives may mean you have an allergic reaction. Report this right away. If you have trouble breathing or facial swelling, call 911 right away.

## 2022-01-25 NOTE — ANESTHESIA PREPROCEDURE EVALUATION
Anesthesia Pre-Procedure Evaluation    Patient: Harley Quarles   MRN: [id number] : 1990        Preoperative Diagnosis: Nasal polyp [J33.9]    Procedure : Procedure(s):  POLYPECTOMY, NASAL CAVITY, ENDOSCOPIC          Past Medical History:   Diagnosis Date     Motion sickness       Past Surgical History:   Procedure Laterality Date     EXTRACTION(S) DENTAL      wisdom teeth      Allergies   Allergen Reactions     Amoxicillin Unknown     Nitrofurantoin Rash     Very small area of arm     Other Environmental Allergy Other (See Comments)     Stuffy nose,itchy eyes     Penicillins Rash      Social History     Tobacco Use     Smoking status: Never Smoker     Smokeless tobacco: Never Used   Substance Use Topics     Alcohol use: Yes      Wt Readings from Last 1 Encounters:   22 55.3 kg (122 lb)        Anesthesia Evaluation   Pt has had prior anesthetic. Type: MAC.    No history of anesthetic complications       ROS/MED HX  ENT/Pulmonary:  - neg pulmonary ROS   (+) allergic rhinitis,     Neurologic:  - neg neurologic ROS     Cardiovascular:  - neg cardiovascular ROS     METS/Exercise Tolerance:     Hematologic:  - neg hematologic  ROS     Musculoskeletal:  - neg musculoskeletal ROS     GI/Hepatic:  - neg GI/hepatic ROS     Renal/Genitourinary:  - neg Renal ROS     Endo:  - neg endo ROS     Psychiatric/Substance Use:  - neg psychiatric ROS   (+) psychiatric history anxiety     Infectious Disease:  - neg infectious disease ROS     Malignancy:  - neg malignancy ROS     Other:  - neg other ROS          Physical Exam    Airway        Mallampati: II   TM distance: > 3 FB   Neck ROM: full   Mouth opening: > 3 cm    Respiratory Devices and Support         Dental  no notable dental history     (+) caps      Cardiovascular   cardiovascular exam normal          Pulmonary   pulmonary exam normal                OUTSIDE LABS:  CBC:   Lab Results   Component Value Date    WBC 5.4 2019    HGB 13.5 2022    HGB 14.0  11/07/2019    HCT 42.4 11/07/2019     11/07/2019     BMP:   Lab Results   Component Value Date     11/07/2019    POTASSIUM 3.7 11/07/2019    CHLORIDE 106 11/07/2019    CO2 24 11/07/2019    BUN 10 11/07/2019    CR 0.78 11/07/2019    GLC 84 11/07/2019     COAGS: No results found for: PTT, INR, FIBR  POC:   Lab Results   Component Value Date    HCG Negative 01/17/2022     HEPATIC:   Lab Results   Component Value Date    ALBUMIN 4.3 11/07/2019    PROTTOTAL 6.8 11/07/2019    ALT 14 11/07/2019    AST 18 11/07/2019    ALKPHOS 36 (L) 11/07/2019    BILITOTAL 0.7 11/07/2019     OTHER:   Lab Results   Component Value Date    COLLEEN 9.3 11/07/2019    TSH 1.05 11/07/2019       Anesthesia Plan    ASA Status:  2   NPO Status:  NPO Appropriate    Anesthesia Type: General.     - Airway: ETT   Induction: Propofol, Intravenous.   Maintenance: TIVA.        Consents    Anesthesia Plan(s) and associated risks, benefits, and realistic alternatives discussed. Questions answered and patient/representative(s) expressed understanding.    - Discussed:     - Discussed with:  Patient         Postoperative Care    Pain management: Multi-modal analgesia.   PONV prophylaxis: Ondansetron (or other 5HT-3), Dexamethasone or Solumedrol     Comments:    Other Comments: Reviewed anesthetic options and risks, including risk of dental trauma. Patient agrees to proceed.     Recent Results (from the past 120 hour(s))  -Asymptomatic COVID-19 Virus (Coronavirus) by PCR Nose:   Collection Time: 01/21/22 10:56 AM  Specimen: Nose; Swab       Result                                            Value                         Ref Range                       SARS CoV2 PCR                                     Negative                      Negative, Testing sent t*            Reji Holloway MD

## 2022-01-25 NOTE — ANESTHESIA CARE TRANSFER NOTE
Patient: Harley Quarles    Procedure: Procedure(s):  POLYPECTOMY, NASAL CAVITY, ENDOSCOPIC       Diagnosis: Nasal polyp [J33.9]  Diagnosis Additional Information: No value filed.    Anesthesia Type:   General     Note:    Oropharynx: oropharynx clear of all foreign objects and spontaneously breathing  Level of Consciousness: drowsy  Oxygen Supplementation: face mask  Level of Supplemental Oxygen (L/min / FiO2): 6  Independent Airway: airway patency satisfactory and stable  Dentition: dentition unchanged  Vital Signs Stable: post-procedure vital signs reviewed and stable  Report to RN Given: handoff report given  Patient transferred to: PACU    Handoff Report: Identifed the Patient, Identified the Reponsible Provider, Reviewed the pertinent medical history, Discussed the surgical course, Reviewed Intra-OP anesthesia mangement and issues during anesthesia, Set expectations for post-procedure period and Allowed opportunity for questions and acknowledgement of understanding      Vitals:  Vitals Value Taken Time   /86    Temp 97.2    Pulse 95    Resp 14    SpO2 100        Electronically Signed By: SHAHRAM Edwadrs CRNA  January 25, 2022  10:08 AM

## 2022-01-25 NOTE — ANESTHESIA PROCEDURE NOTES
Airway       Patient location during procedure: OR       Procedure Start/Stop Times: 1/25/2022 9:11 AM  Staff -        CRNA: Dianna Ross APRN CRNA       Performed By: CRNAIndications and Patient Condition       Indications for airway management: sarah-procedural       Induction type:intravenous       Mask difficulty assessment: 1 - vent by mask    Final Airway Details       Final airway type: endotracheal airway       Successful airway: ETT - single, Oral and JANNY  Endotracheal Airway Details        ETT size (mm): 7.0       Successful intubation technique: direct laryngoscopy       DL Blade Type: De Paz 2       Grade View of Cords: 1       Adjucts: stylet       Position: Center       Measured from: gums/teeth       Secured at (cm): 22       Bite block used: Oral Airway    Post intubation assessment        Placement verified by: capnometry, equal breath sounds and chest rise        Number of attempts at approach: 1       Secured with: silk tape       Ease of procedure: easy       Dentition: Intact and Unchanged

## 2022-01-27 ENCOUNTER — TELEPHONE (OUTPATIENT)
Dept: OTOLARYNGOLOGY | Facility: CLINIC | Age: 32
End: 2022-01-27
Payer: COMMERCIAL

## 2022-01-27 NOTE — TELEPHONE ENCOUNTER
Talked with Harley about her pain and nausea after surgery on Tuesday.  She mentioned that she vomited twice last night after eating a meal.  She then tried to take a ibuprofen and threw it up last night.  She took her anti-nausea medication, Tylenol and Tramadol this morning and ate some yogurt.  No more vomiting just a little nausea.  She said that she is having pain in her face but the Tylenol and Tramadol are working for pain.  I mentioned to keep drinking fluids, eating light meals, staying elevated while resting and to no blow her nose.  Patient expressed understanding.    [facility]      Rossana Monreal RN  [facility]  ENT  [address] [address]  [email or web address]   Office:[phone]  Employed by [facility]

## 2022-01-28 NOTE — TELEPHONE ENCOUNTER
Talked with the patient about blood clots coming from her nose when using the david pot.  I mentioned that it is fine.  Patient expressed understanding.    [facility]      Rossana Monreal RN  [facility]  ENT  [facility] [address]  [email or web address]   Office:[phone]  Employed by [facility]

## 2022-02-01 NOTE — TELEPHONE ENCOUNTER
Talked with Harley about her questions to losing her smell and taste after her surgery.  I let her know that it is a normal healing process and that it is only temporary.  Patient expressed understanding.    [facility]      Rossana Monreal RN  [facility]  ENT  [facility] [address]  [email or web address]   Office:[phone]  Employed by [employer]

## 2022-02-11 ENCOUNTER — OFFICE VISIT (OUTPATIENT)
Dept: OTOLARYNGOLOGY | Facility: CLINIC | Age: 32
End: 2022-02-11
Payer: COMMERCIAL

## 2022-02-11 DIAGNOSIS — J33.9 NASAL POLYP: Primary | ICD-10-CM

## 2022-02-11 PROCEDURE — 31231 NASAL ENDOSCOPY DX: CPT | Performed by: OTOLARYNGOLOGY

## 2022-02-11 PROCEDURE — 99213 OFFICE O/P EST LOW 20 MIN: CPT | Mod: 25 | Performed by: OTOLARYNGOLOGY

## 2022-02-11 NOTE — PROGRESS NOTES
HPI: This patient is a 32yo F who presents for a post-op visit s/p FESS. Doing well overall. Reports improved nasal breathing and denies problems with bleeding.    Past medical history, past surgical history, social history, medications, and allergies have been reviewed with the patient and are documented above.    Review of Systems: an ENT specific review of systems is normal    PHYSICAL EXAMINATION:  GEN: no acute distress, normocephalic  NOSE: nares patent, septum non-obstructing. Tissues are pretty dry.   NASAL ENDOSCOPY: OMCs with Propel stents and minimal crusting  NECK: soft and supple. No lymphadenopathy or masses. Airway is midline.  PULM: breathing comfortably on room air, normal chest expansion with respiration    MEDICAL DECISION-MAKING: doing well s/p FESS. Will be okay to resume full activity and nose blowing. Continue nasal saline irrigation. RTC in 1 month.

## 2022-02-11 NOTE — LETTER
2/11/2022         RE: Harley Quarles  [address]  [place] [facility] [place] [address]        Dear Colleague,    Thank you for referring your patient, Harley Quarles, to the [facility]. Please see a copy of my visit note below.    HPI: This patient is a 30yo F who presents for a post-op visit s/p FESS. Doing well overall. Reports improved nasal breathing and denies problems with bleeding.    Past medical history, past surgical history, social history, medications, and allergies have been reviewed with the patient and are documented above.    Review of Systems: an ENT specific review of systems is normal    PHYSICAL EXAMINATION:  GEN: no acute distress, normocephalic  NOSE: nares patent, septum non-obstructing. Tissues are pretty dry.   NASAL ENDOSCOPY: OMCs with Propel stents and minimal crusting  NECK: soft and supple. No lymphadenopathy or masses. Airway is midline.  PULM: breathing comfortably on room air, normal chest expansion with respiration    MEDICAL DECISION-MAKING: doing well s/p FESS. Will be okay to resume full activity and nose blowing. Continue nasal saline irrigation. RTC in 1 month.          Again, thank you for allowing me to participate in the care of your patient.        Sincerely,        Jayna Boykin MD

## 2022-03-23 ENCOUNTER — OFFICE VISIT (OUTPATIENT)
Dept: OTOLARYNGOLOGY | Facility: CLINIC | Age: 32
End: 2022-03-23
Payer: COMMERCIAL

## 2022-03-23 DIAGNOSIS — J33.9 NASAL POLYP: Primary | ICD-10-CM

## 2022-03-23 PROCEDURE — 99213 OFFICE O/P EST LOW 20 MIN: CPT | Mod: 25 | Performed by: OTOLARYNGOLOGY

## 2022-03-23 PROCEDURE — 31231 NASAL ENDOSCOPY DX: CPT | Performed by: OTOLARYNGOLOGY

## 2022-03-23 NOTE — PROGRESS NOTES
HPI: This patient is a 32yo F who presents for a second post-op visit s/p FESS. Doing well overall. Reports improved nasal breathing and denies problems with bleeding. Knows that the right stent came out, still feeling like the left might be up there.      Past medical history, past surgical history, social history, medications, and allergies have been reviewed with the patient and are documented above.     Review of Systems: an ENT specific review of systems is normal     PHYSICAL EXAMINATION:  GEN: no acute distress, normocephalic  NOSE: nares patent, septum non-obstructing. Tissues are pretty dry.   NASAL ENDOSCOPY: OMC on the right is patent and the tissue is well healed. OMC on the left is patent with no stent. There is a small tuft of polyp that has recurred at the superior margin of the maxillary antrostomy, non-obstructive.  NECK: soft and supple. No lymphadenopathy or masses. Airway is midline.  PULM: breathing comfortably on room air, normal chest expansion with respiration     MEDICAL DECISION-MAKING: doing well s/p FESS. Well healed at this point. Should restart her nasal steroid sprays. Will recheck in 6 months. If polyp(s) are enlarging, will consider dupixent.

## 2022-03-23 NOTE — LETTER
3/23/2022         RE: Harley Quarles  [address]  [place] [facility] [address]        Dear Colleague,    Thank you for referring your patient, Harley Quarles, to the [facility]. Please see a copy of my visit note below.    HPI: This patient is a 30yo F who presents for a second post-op visit s/p FESS. Doing well overall. Reports improved nasal breathing and denies problems with bleeding. Knows that the right stent came out, still feeling like the left might be up there.      Past medical history, past surgical history, social history, medications, and allergies have been reviewed with the patient and are documented above.     Review of Systems: an ENT specific review of systems is normal     PHYSICAL EXAMINATION:  GEN: no acute distress, normocephalic  NOSE: nares patent, septum non-obstructing. Tissues are pretty dry.   NASAL ENDOSCOPY: OMC on the right is patent and the tissue is well healed. OMC on the left is patent with no stent. There is a small tuft of polyp that has recurred at the superior margin of the maxillary antrostomy, non-obstructive.  NECK: soft and supple. No lymphadenopathy or masses. Airway is midline.  PULM: breathing comfortably on room air, normal chest expansion with respiration     MEDICAL DECISION-MAKING: doing well s/p FESS. Well healed at this point. Should restart her nasal steroid sprays. Will recheck in 6 months. If polyp(s) are enlarging, will consider dupixent.      Again, thank you for allowing me to participate in the care of your patient.        Sincerely,        Jayna Boykin MD

## 2022-05-18 ENCOUNTER — OFFICE VISIT (OUTPATIENT)
Dept: FAMILY MEDICINE | Facility: CLINIC | Age: 32
End: 2022-05-18
Payer: COMMERCIAL

## 2022-05-18 VITALS
SYSTOLIC BLOOD PRESSURE: 110 MMHG | OXYGEN SATURATION: 98 % | WEIGHT: 122.2 LBS | HEART RATE: 106 BPM | DIASTOLIC BLOOD PRESSURE: 60 MMHG | BODY MASS INDEX: 19.13 KG/M2

## 2022-05-18 DIAGNOSIS — R21 RASH AND NONSPECIFIC SKIN ERUPTION: Primary | ICD-10-CM

## 2022-05-18 DIAGNOSIS — R10.13 EPIGASTRIC PAIN: ICD-10-CM

## 2022-05-18 LAB
KOH PREPARATION: NORMAL
KOH PREPARATION: NORMAL

## 2022-05-18 PROCEDURE — 87220 TISSUE EXAM FOR FUNGI: CPT | Performed by: NURSE PRACTITIONER

## 2022-05-18 PROCEDURE — 99213 OFFICE O/P EST LOW 20 MIN: CPT | Performed by: NURSE PRACTITIONER

## 2022-05-18 NOTE — PROGRESS NOTES
Assessment & Plan   1. Rash and nonspecific skin eruption  Appearance consistent with tinea versicolor. Will check KOH to confirm.   - KOH prep (skin, hair or nails only)    2. Epigastric pain  Two week history of epigastric pain without radiation, nausea or vomiting. Denies reflux symptoms.  Discussed differentials, notably PUD, gastritis, cholelithiasis. Check H. Pylori and start on two week course of omeprazole after lab obtained.  If no improvement with this, consider RUQ US.  She feels comfortable with this plan.   - Helicobacter pylori Antigen Stool; Future    Shazia Caban, CNP    Subjective     HPI:   Harley Quarles is a 31 year old female who presents for rash    Rash:  Bilateral arms, groin, abdomen.  Present for two weeks.  Not pruritic.  Slightly raised and some areas are scaling.  No known exposures. Seems to be spreading.  No prior h/o similar.     Abdominal pain: Present two weeks as well. Consistently after meals.  No N/V or radiation. No diarrhea or constipation.  Denies reflux symptoms.        Allergies:  is allergic to amoxicillin, nitrofurantoin, other environmental allergy, and penicillins.    SH/FH:  Social History and Family History reviewed and updated.   Tobacco Status:  She  reports that she has never smoked. She has never used smokeless tobacco.    Review of Systems:  A complete head to toe ROS is negative unless otherwise noted in HPI    Objective     Vitals:    05/18/22 1450   BP: 110/60   BP Location: Left arm   Patient Position: Sitting   Cuff Size: Adult Regular   Pulse: 106   SpO2: 98%   Weight: 55.4 kg (122 lb 3.2 oz)       Physical Exam:  GENERAL: Alert, well-appearing   SKIN: Erythematous macular rash on bilateral inner arms, groin and lower abdomen. Roughly annular lesions, some confluent. Fine scale.   ABDOMEN: BS+. Abdomen soft, nontender to palpation without guarding. No organomegaly, masses or hernias        Answers for HPI/ROS submitted by the patient on 5/16/2022  How  many servings of fruits and vegetables do you eat daily?: 0-1  On average, how many sweetened beverages do you drink each day (Examples: soda, juice, sweet tea, etc.  Do NOT count diet or artificially sweetened beverages)?: 1  How many minutes a day do you exercise enough to make your heart beat faster?: 20 to 29  How many days a week do you exercise enough to make your heart beat faster?: 3 or less  How many days per week do you miss taking your medication?: 0  What is the reason for your visit today?: Eczema, and potential yeast infection  When did your symptoms begin?: 1-2 weeks ago  What are your symptoms?: Little red patches of dry skin all over my body, and for yeast infection, mild tenderness in vulva area and burning during sex  How would you describe these symptoms?: Mild  Are your symptoms:: Staying the same  Have you had these symptoms before?: No  Is there anything that makes you feel worse?: N/A  Is there anything that makes you feel better?: Lotion may be improving the eczema?

## 2022-05-19 ENCOUNTER — LAB (OUTPATIENT)
Dept: LAB | Facility: CLINIC | Age: 32
End: 2022-05-19
Payer: COMMERCIAL

## 2022-05-19 DIAGNOSIS — R21 RASH AND NONSPECIFIC SKIN ERUPTION: Primary | ICD-10-CM

## 2022-05-19 DIAGNOSIS — R10.13 EPIGASTRIC PAIN: ICD-10-CM

## 2022-05-19 PROCEDURE — 87338 HPYLORI STOOL AG IA: CPT

## 2022-05-19 RX ORDER — KETOCONAZOLE 20 MG/ML
SHAMPOO TOPICAL DAILY
Qty: 120 ML | Refills: 1 | Status: SHIPPED | OUTPATIENT
Start: 2022-05-19 | End: 2022-05-25

## 2022-05-20 LAB
Lab: NORMAL
PERFORMING LABORATORY: NORMAL
SPECIMEN STATUS: NORMAL
TEST NAME: NORMAL

## 2022-05-24 ENCOUNTER — MYC MEDICAL ADVICE (OUTPATIENT)
Dept: FAMILY MEDICINE | Facility: CLINIC | Age: 32
End: 2022-05-24
Payer: COMMERCIAL

## 2022-05-24 DIAGNOSIS — R21 RASH AND NONSPECIFIC SKIN ERUPTION: ICD-10-CM

## 2022-05-24 LAB — MISCELLANEOUS TEST 1 (ARUP): NORMAL

## 2022-05-25 RX ORDER — KETOCONAZOLE 20 MG/ML
SHAMPOO TOPICAL DAILY
Qty: 360 ML | Refills: 3 | Status: SHIPPED | OUTPATIENT
Start: 2022-05-25 | End: 2022-05-27

## 2022-05-27 RX ORDER — KETOCONAZOLE 20 MG/ML
SHAMPOO TOPICAL DAILY
Qty: 360 ML | Refills: 3 | Status: SHIPPED | OUTPATIENT
Start: 2022-05-27

## 2022-09-16 ENCOUNTER — OFFICE VISIT (OUTPATIENT)
Dept: OTOLARYNGOLOGY | Facility: CLINIC | Age: 32
End: 2022-09-16
Payer: COMMERCIAL

## 2022-09-16 DIAGNOSIS — J33.9 NASAL POLYP: Primary | ICD-10-CM

## 2022-09-16 PROCEDURE — 31231 NASAL ENDOSCOPY DX: CPT | Performed by: OTOLARYNGOLOGY

## 2022-09-16 RX ORDER — FLUTICASONE PROPIONATE 50 MCG
2 SPRAY, SUSPENSION (ML) NASAL DAILY
Qty: 15.8 ML | Refills: 11 | Status: SHIPPED | OUTPATIENT
Start: 2022-09-16 | End: 2023-09-16

## 2022-09-16 RX ORDER — VALACYCLOVIR HYDROCHLORIDE 500 MG/1
TABLET, FILM COATED ORAL
COMMUNITY
Start: 2022-08-26 | End: 2023-11-24

## 2022-09-16 NOTE — LETTER
9/16/2022         RE: Harley Quarles  [address] [place] [address]        Dear Colleague,    Thank you for referring your patient, Harley Quarles, to the [facility]. Please see a copy of my visit note below.    HPI: This patient is a 30yo F who presents for follow-up s/p FESS. Doing well overall. Reports improved nasal breathing and denies problems with bleeding. Has been on Flonase and is doing great, no issues with her smell or drainage.      Past medical history, past surgical history, social history, medications, and allergies have been reviewed with the patient and are documented above.     Review of Systems: an ENT specific review of systems is normal     PHYSICAL EXAMINATION:  GEN: no acute distress, normocephalic  NOSE: nares patent, septum non-obstructing. Tissues are pretty dry.   NASAL ENDOSCOPY: OMC on the right is patent and the tissue is well healed. OMC on the left is patent with no stent. There is a small tuft of polyp that has recurred at the superior margin of the maxillary antrostomy, non-obstructive, and not any larger or different from 6 months ago.  NECK: soft and supple. No lymphadenopathy or masses. Airway is midline.  PULM: breathing comfortably on room air, normal chest expansion with respiration     MEDICAL DECISION-MAKING: doing well s/p FESS. Well healed at this point. RTC PRN.      Again, thank you for allowing me to participate in the care of your patient.        Sincerely,        Jayna Boykin MD

## 2022-09-16 NOTE — PROGRESS NOTES
HPI: This patient is a 32yo F who presents for follow-up s/p FESS. Doing well overall. Reports improved nasal breathing and denies problems with bleeding. Has been on Flonase and is doing great, no issues with her smell or drainage.      Past medical history, past surgical history, social history, medications, and allergies have been reviewed with the patient and are documented above.     Review of Systems: an ENT specific review of systems is normal     PHYSICAL EXAMINATION:  GEN: no acute distress, normocephalic  NOSE: nares patent, septum non-obstructing. Tissues are pretty dry.   NASAL ENDOSCOPY: OMC on the right is patent and the tissue is well healed. OMC on the left is patent with no stent. There is a small tuft of polyp that has recurred at the superior margin of the maxillary antrostomy, non-obstructive, and not any larger or different from 6 months ago.  NECK: soft and supple. No lymphadenopathy or masses. Airway is midline.  PULM: breathing comfortably on room air, normal chest expansion with respiration     MEDICAL DECISION-MAKING: doing well s/p FESS. Well healed at this point. RTC PRN.

## 2022-09-24 ENCOUNTER — HEALTH MAINTENANCE LETTER (OUTPATIENT)
Age: 32
End: 2022-09-24

## 2022-11-21 ASSESSMENT — ENCOUNTER SYMPTOMS
HEMATURIA: 0
FEVER: 0
MYALGIAS: 0
PALPITATIONS: 0
HEARTBURN: 0
DYSURIA: 0
SORE THROAT: 0
FREQUENCY: 0
HEADACHES: 0
JOINT SWELLING: 0
ARTHRALGIAS: 0
CHILLS: 0
WEAKNESS: 0
BREAST MASS: 0
HEMATOCHEZIA: 0
PARESTHESIAS: 0
DIARRHEA: 0
EYE PAIN: 0
CONSTIPATION: 0
NAUSEA: 0
ABDOMINAL PAIN: 0
DIZZINESS: 0
NERVOUS/ANXIOUS: 0
SHORTNESS OF BREATH: 0
COUGH: 0

## 2022-11-22 ENCOUNTER — OFFICE VISIT (OUTPATIENT)
Dept: FAMILY MEDICINE | Facility: CLINIC | Age: 32
End: 2022-11-22
Payer: COMMERCIAL

## 2022-11-22 VITALS
WEIGHT: 126.3 LBS | SYSTOLIC BLOOD PRESSURE: 120 MMHG | BODY MASS INDEX: 19.82 KG/M2 | DIASTOLIC BLOOD PRESSURE: 81 MMHG | HEART RATE: 86 BPM | OXYGEN SATURATION: 99 % | HEIGHT: 67 IN | TEMPERATURE: 98.5 F | RESPIRATION RATE: 19 BRPM

## 2022-11-22 DIAGNOSIS — R07.89 CHEST WALL DISCOMFORT: ICD-10-CM

## 2022-11-22 DIAGNOSIS — F41.9 ACUTE ANXIETY: ICD-10-CM

## 2022-11-22 DIAGNOSIS — R14.2 BURPING: ICD-10-CM

## 2022-11-22 DIAGNOSIS — F41.1 GAD (GENERALIZED ANXIETY DISORDER): ICD-10-CM

## 2022-11-22 DIAGNOSIS — Z23 NEED FOR PROPHYLACTIC VACCINATION AND INOCULATION AGAINST INFLUENZA: ICD-10-CM

## 2022-11-22 DIAGNOSIS — Z00.00 ANNUAL PHYSICAL EXAM: Primary | ICD-10-CM

## 2022-11-22 DIAGNOSIS — K21.9 GASTROESOPHAGEAL REFLUX DISEASE WITHOUT ESOPHAGITIS: ICD-10-CM

## 2022-11-22 DIAGNOSIS — Z30.40 ENCOUNTER FOR SURVEILLANCE OF CONTRACEPTIVES, UNSPECIFIED CONTRACEPTIVE: ICD-10-CM

## 2022-11-22 LAB
ALBUMIN SERPL BCG-MCNC: 4.4 G/DL (ref 3.5–5.2)
ALP SERPL-CCNC: 46 U/L (ref 35–104)
ALT SERPL W P-5'-P-CCNC: 15 U/L (ref 10–35)
ANION GAP SERPL CALCULATED.3IONS-SCNC: 21 MMOL/L (ref 7–15)
AST SERPL W P-5'-P-CCNC: 32 U/L (ref 10–35)
BILIRUB SERPL-MCNC: 0.4 MG/DL
BUN SERPL-MCNC: 10.6 MG/DL (ref 6–20)
CALCIUM SERPL-MCNC: 9.4 MG/DL (ref 8.6–10)
CHLORIDE SERPL-SCNC: 106 MMOL/L (ref 98–107)
CHOLEST SERPL-MCNC: 238 MG/DL
CREAT SERPL-MCNC: 0.92 MG/DL (ref 0.51–0.95)
DEPRECATED HCO3 PLAS-SCNC: 16 MMOL/L (ref 22–29)
ERYTHROCYTE [DISTWIDTH] IN BLOOD BY AUTOMATED COUNT: 12.2 % (ref 10–15)
GFR SERPL CREATININE-BSD FRML MDRD: 84 ML/MIN/1.73M2
GLUCOSE SERPL-MCNC: 91 MG/DL (ref 70–99)
HCT VFR BLD AUTO: 43.1 % (ref 35–47)
HDLC SERPL-MCNC: 96 MG/DL
HGB BLD-MCNC: 13.9 G/DL (ref 11.7–15.7)
LDLC SERPL CALC-MCNC: 121 MG/DL
MCH RBC QN AUTO: 29.5 PG (ref 26.5–33)
MCHC RBC AUTO-ENTMCNC: 32.3 G/DL (ref 31.5–36.5)
MCV RBC AUTO: 92 FL (ref 78–100)
NONHDLC SERPL-MCNC: 142 MG/DL
PLATELET # BLD AUTO: 270 10E3/UL (ref 150–450)
POTASSIUM SERPL-SCNC: 4.5 MMOL/L (ref 3.4–5.3)
PROT SERPL-MCNC: 7.4 G/DL (ref 6.4–8.3)
RBC # BLD AUTO: 4.71 10E6/UL (ref 3.8–5.2)
SODIUM SERPL-SCNC: 143 MMOL/L (ref 136–145)
TRIGL SERPL-MCNC: 103 MG/DL
WBC # BLD AUTO: 6.2 10E3/UL (ref 4–11)

## 2022-11-22 PROCEDURE — 85027 COMPLETE CBC AUTOMATED: CPT | Performed by: INTERNAL MEDICINE

## 2022-11-22 PROCEDURE — 90686 IIV4 VACC NO PRSV 0.5 ML IM: CPT | Performed by: INTERNAL MEDICINE

## 2022-11-22 PROCEDURE — 99395 PREV VISIT EST AGE 18-39: CPT | Mod: 25 | Performed by: INTERNAL MEDICINE

## 2022-11-22 PROCEDURE — 80061 LIPID PANEL: CPT | Performed by: INTERNAL MEDICINE

## 2022-11-22 PROCEDURE — 90471 IMMUNIZATION ADMIN: CPT | Performed by: INTERNAL MEDICINE

## 2022-11-22 PROCEDURE — 99214 OFFICE O/P EST MOD 30 MIN: CPT | Mod: 25 | Performed by: INTERNAL MEDICINE

## 2022-11-22 PROCEDURE — 36415 COLL VENOUS BLD VENIPUNCTURE: CPT | Performed by: INTERNAL MEDICINE

## 2022-11-22 PROCEDURE — 80053 COMPREHEN METABOLIC PANEL: CPT | Performed by: INTERNAL MEDICINE

## 2022-11-22 RX ORDER — PANTOPRAZOLE SODIUM 20 MG/1
20 TABLET, DELAYED RELEASE ORAL DAILY
Qty: 60 TABLET | Refills: 0 | Status: SHIPPED | OUTPATIENT
Start: 2022-11-22 | End: 2022-12-15

## 2022-11-22 RX ORDER — ESCITALOPRAM OXALATE 5 MG/1
5 TABLET ORAL DAILY
Qty: 90 TABLET | Refills: 1 | Status: SHIPPED | OUTPATIENT
Start: 2022-11-22 | End: 2023-03-09

## 2022-11-22 RX ORDER — DESOGESTREL AND ETHINYL ESTRADIOL 0.15-0.03
1 KIT ORAL DAILY
Qty: 84 TABLET | Refills: 3 | Status: SHIPPED | OUTPATIENT
Start: 2022-11-22 | End: 2023-11-09

## 2022-11-22 RX ORDER — LORAZEPAM 0.5 MG/1
0.5 TABLET ORAL EVERY 6 HOURS PRN
Qty: 15 TABLET | Refills: 0 | Status: SHIPPED | OUTPATIENT
Start: 2022-11-22 | End: 2023-12-11

## 2022-11-22 ASSESSMENT — ENCOUNTER SYMPTOMS
PARESTHESIAS: 0
WEAKNESS: 0
CONSTIPATION: 0
CHILLS: 0
HEMATOCHEZIA: 0
PALPITATIONS: 0
SORE THROAT: 0
ARTHRALGIAS: 0
ABDOMINAL PAIN: 0
DIARRHEA: 0
NERVOUS/ANXIOUS: 0
MYALGIAS: 0
HEARTBURN: 0
BREAST MASS: 0
NAUSEA: 0
FREQUENCY: 0
JOINT SWELLING: 0
EYE PAIN: 0
DYSURIA: 0
DIZZINESS: 0
HEADACHES: 0
HEMATURIA: 0
COUGH: 0
FEVER: 0
SHORTNESS OF BREATH: 0

## 2022-11-22 ASSESSMENT — PAIN SCALES - GENERAL: PAINLEVEL: NO PAIN (0)

## 2022-11-22 NOTE — PROGRESS NOTES
SUBJECTIVE:   CC: Harley is an 32 year old who presents for preventive health visit.   Patient has been advised of split billing requirements and indicates understanding: Yes  Healthy Habits:     Getting at least 3 servings of Calcium per day:  NO    Bi-annual eye exam:  Yes    Dental care twice a year:  Yes    Sleep apnea or symptoms of sleep apnea:  None    Diet:  Regular (no restrictions)    Frequency of exercise:  2-3 days/week    Duration of exercise:  15-30 minutes    Taking medications regularly:  Yes    Medication side effects:  None    PHQ-2 Total Score: 0    Additional concerns today:  Yes    Harley is a very pleasant 32 year old who presented to the clinic for APE  She has ABI and would like to start lexparo 5 mg daily   She takes ativan as needed.   She reports chest wall/back discomfort which is unrelated to exertion or food, related to movement.   She is concerned about loud burps. She does have some GERD as well.  Due for flu shot     Today's PHQ-2 Score:   PHQ-2 ( 1999 Pfizer) 11/21/2022   Q1: Little interest or pleasure in doing things 0   Q2: Feeling down, depressed or hopeless 0   PHQ-2 Score 0   Q1: Little interest or pleasure in doing things Not at all   Q2: Feeling down, depressed or hopeless Not at all   PHQ-2 Score 0     Social History     Tobacco Use     Smoking status: Never     Smokeless tobacco: Never   Substance Use Topics     Alcohol use: Yes     Comment: Occasional     If you drink alcohol do you typically have >3 drinks per day or >7 drinks per week? No    No flowsheet data found.    Reviewed orders with patient.  Reviewed health maintenance and updated orders accordingly - Yes  Lab work is in process    Breast Cancer Screening:    FHS-7:   Breast CA Risk Assessment (FHS-7) 11/15/2021 11/15/2021 11/15/2021 12/7/2021 1/14/2022 11/21/2022   Did any of your first-degree relatives have breast or ovarian cancer? No No No No No No   Did any of your relatives have bilateral breast cancer?  No No No No No No   Did any man in your family have breast cancer? No No No No No No   Did any woman in your family have breast and ovarian cancer? No No No No No No   Did any woman in your family have breast cancer before age 50 y? No No No No No No   Do you have 2 or more relatives with breast and/or ovarian cancer? No No No No No No   Do you have 2 or more relatives with breast and/or bowel cancer? No No No No No No     Patient under 40 years of age: Routine Mammogram Screening not recommended.   Pertinent mammograms are reviewed under the imaging tab.    History of abnormal Pap smear: NO - age 30- 65 PAP every 3 years recommended  PAP / HPV Latest Ref Rng & Units 12/9/2021 10/29/2018   PAP   Negative for Intraepithelial Lesion or Malignancy (NILM) Negative for squamous intraepithelial lesion or malignancy  Electronically signed by Kathleen Majano CT (ASCP) on 11/5/2018 at 11:48 AM     HPV16 Negative Negative Negative   HPV18 Negative Negative Negative   HRHPV Negative Negative Negative     Review of Systems   Constitutional: Negative for chills and fever.   HENT: Negative for congestion, ear pain, hearing loss and sore throat.    Eyes: Negative for pain and visual disturbance.   Respiratory: Negative for cough and shortness of breath.    Cardiovascular: Negative for chest pain, palpitations and peripheral edema.   Gastrointestinal: Negative for abdominal pain, constipation, diarrhea, heartburn, hematochezia and nausea.   Breasts:  Negative for tenderness, breast mass and discharge.   Genitourinary: Negative for dysuria, frequency, genital sores, hematuria, pelvic pain, urgency, vaginal bleeding and vaginal discharge.   Musculoskeletal: Negative for arthralgias, joint swelling and myalgias.   Skin: Negative for rash.   Neurological: Negative for dizziness, weakness, headaches and paresthesias.   Psychiatric/Behavioral: Negative for mood changes. The patient is not nervous/anxious.         OBJECTIVE:   BP  "120/81 (BP Location: Left arm, Patient Position: Sitting, Cuff Size: Adult Regular)   Pulse 86   Temp 98.5  F (36.9  C) (Oral)   Resp 19   Ht 1.702 m (5' 7.01\")   Wt 57.3 kg (126 lb 4.8 oz)   LMP 10/24/2022 (Approximate)   SpO2 99%   BMI 19.78 kg/m    Physical Exam  Vitals reviewed.   Constitutional:       Appearance: Normal appearance.   HENT:      Right Ear: Tympanic membrane normal. There is no impacted cerumen.      Left Ear: Tympanic membrane normal. There is no impacted cerumen.      Mouth/Throat:      Mouth: Mucous membranes are moist.      Pharynx: Oropharynx is clear. No oropharyngeal exudate or posterior oropharyngeal erythema.   Cardiovascular:      Rate and Rhythm: Normal rate and regular rhythm.      Heart sounds: Normal heart sounds. No murmur heard.    No gallop.   Pulmonary:      Effort: Pulmonary effort is normal. No respiratory distress.      Breath sounds: Normal breath sounds. No stridor. No wheezing, rhonchi or rales.   Chest:   Breasts:     Right: No swelling, bleeding, inverted nipple, mass, nipple discharge, skin change or tenderness.      Left: No swelling, bleeding, inverted nipple, mass, nipple discharge, skin change or tenderness.   Abdominal:      General: Abdomen is flat. There is no distension.      Palpations: Abdomen is soft. There is no mass.      Tenderness: There is no abdominal tenderness. There is no guarding.      Hernia: No hernia is present.   Musculoskeletal:         General: Normal range of motion.      Cervical back: Normal range of motion and neck supple. No rigidity.      Right lower leg: No edema.      Left lower leg: No edema.   Lymphadenopathy:      Cervical: No cervical adenopathy.   Skin:     General: Skin is warm and dry.   Neurological:      General: No focal deficit present.      Mental Status: She is alert.       ASSESSMENT/PLAN:   Harley was seen today for physical, establish care and imm/inj.    Diagnoses and all orders for this visit:    Annual " physical exam  -     CBC with Platelets (Today); Future  -     COMPREHENSIVE METABOLIC PANEL; Future  -     Lipid Profile; Future    ABI (generalized anxiety disorder)  Stable. Start lexapro 5 mg daily   -     escitalopram (LEXAPRO) 5 MG tablet; Take 1 tablet (5 mg) by mouth daily    Encounter for surveillance of contraceptives, unspecified contraceptive  -     desogestrel-ethinyl estradiol (APRI) 0.15-30 MG-MCG tablet; Take 1 tablet by mouth daily    Acute anxiety  -     LORazepam (ATIVAN) 0.5 MG tablet; Take 1 tablet (0.5 mg) by mouth every 6 hours as needed for anxiety    Chest wall discomfort  Discussed about causes of chest discomfort.   She is of reproductive age, not a smoker, no family hx of heart disease.  She will note down triggers and relieving factors and let me know via mychart    Burping  -     pantoprazole (PROTONIX) 20 MG EC tablet; Take 1 tablet (20 mg) by mouth daily    Gastroesophageal reflux disease without esophagitis  -     pantoprazole (PROTONIX) 20 MG EC tablet; Take 1 tablet (20 mg) by mouth daily    Need for prophylactic vaccination and inoculation against influenza  Flu shot    Other orders  -     INFLUENZA VACCINE IM > 6 MONTHS VALENT IIV4 (AFLURIA/FLUZONE)    Patient has been advised of split billing requirements and indicates understanding: Yes    COUNSELING:  Reviewed preventive health counseling, as reflected in patient instructions       Immunizations    Vaccinated for: Influenza      She reports that she has never smoked. She has never used smokeless tobacco.    LUCILA APPLE MD  [facility]

## 2022-12-30 DIAGNOSIS — K21.9 GASTROESOPHAGEAL REFLUX DISEASE WITHOUT ESOPHAGITIS: ICD-10-CM

## 2022-12-30 DIAGNOSIS — R14.2 BURPING: ICD-10-CM

## 2023-01-02 RX ORDER — PANTOPRAZOLE SODIUM 20 MG/1
TABLET, DELAYED RELEASE ORAL
Qty: 90 TABLET | Refills: 2 | Status: SHIPPED | OUTPATIENT
Start: 2023-01-02 | End: 2023-12-11

## 2023-02-16 ENCOUNTER — OFFICE VISIT (OUTPATIENT)
Dept: URGENT CARE | Facility: URGENT CARE | Age: 33
End: 2023-02-16
Payer: COMMERCIAL

## 2023-02-16 VITALS
HEART RATE: 80 BPM | TEMPERATURE: 98.9 F | RESPIRATION RATE: 16 BRPM | DIASTOLIC BLOOD PRESSURE: 86 MMHG | SYSTOLIC BLOOD PRESSURE: 122 MMHG | OXYGEN SATURATION: 98 %

## 2023-02-16 DIAGNOSIS — N76.0 BV (BACTERIAL VAGINOSIS): ICD-10-CM

## 2023-02-16 DIAGNOSIS — N30.01 ACUTE CYSTITIS WITH HEMATURIA: ICD-10-CM

## 2023-02-16 DIAGNOSIS — B96.89 BV (BACTERIAL VAGINOSIS): ICD-10-CM

## 2023-02-16 DIAGNOSIS — R30.0 DYSURIA: Primary | ICD-10-CM

## 2023-02-16 LAB
ALBUMIN UR-MCNC: 100 MG/DL
APPEARANCE UR: ABNORMAL
BACTERIA #/AREA URNS HPF: ABNORMAL /HPF
BILIRUB UR QL STRIP: NEGATIVE
CLUE CELLS: PRESENT
COLOR UR AUTO: ABNORMAL
GLUCOSE UR STRIP-MCNC: NEGATIVE MG/DL
HGB UR QL STRIP: ABNORMAL
KETONES UR STRIP-MCNC: NEGATIVE MG/DL
LEUKOCYTE ESTERASE UR QL STRIP: ABNORMAL
NITRATE UR QL: NEGATIVE
PH UR STRIP: 6 [PH] (ref 5–7)
RBC #/AREA URNS AUTO: ABNORMAL /HPF
SP GR UR STRIP: 1.01 (ref 1–1.03)
SQUAMOUS #/AREA URNS AUTO: ABNORMAL /LPF
TRICHOMONAS, WET PREP: ABNORMAL
UROBILINOGEN UR STRIP-ACNC: 0.2 E.U./DL
WBC #/AREA URNS AUTO: ABNORMAL /HPF
WBC'S/HIGH POWER FIELD, WET PREP: ABNORMAL
YEAST, WET PREP: ABNORMAL

## 2023-02-16 PROCEDURE — 87186 SC STD MICRODIL/AGAR DIL: CPT | Performed by: PHYSICIAN ASSISTANT

## 2023-02-16 PROCEDURE — 87086 URINE CULTURE/COLONY COUNT: CPT | Performed by: PHYSICIAN ASSISTANT

## 2023-02-16 PROCEDURE — 87210 SMEAR WET MOUNT SALINE/INK: CPT | Performed by: PHYSICIAN ASSISTANT

## 2023-02-16 PROCEDURE — 81001 URINALYSIS AUTO W/SCOPE: CPT | Performed by: PHYSICIAN ASSISTANT

## 2023-02-16 PROCEDURE — 99213 OFFICE O/P EST LOW 20 MIN: CPT | Performed by: PHYSICIAN ASSISTANT

## 2023-02-16 RX ORDER — METRONIDAZOLE 500 MG/1
500 TABLET ORAL 2 TIMES DAILY
Qty: 14 TABLET | Refills: 0 | Status: SHIPPED | OUTPATIENT
Start: 2023-02-16 | End: 2023-02-23

## 2023-02-16 RX ORDER — CIPROFLOXACIN 500 MG/1
500 TABLET, FILM COATED ORAL 2 TIMES DAILY
Qty: 10 TABLET | Refills: 0 | Status: SHIPPED | OUTPATIENT
Start: 2023-02-16 | End: 2023-02-21

## 2023-02-16 NOTE — PROGRESS NOTES
SUBJECTIVE:   Harley Quarles is a 32 year old female presenting with a chief complaint of   Chief Complaint   Patient presents with     Vaginal Problem     Some discomfort, burning since yesterday, stopped using tampons today, noticed while using Sx. started      Dysuria       She is an established patient of [facility].  Patient presents with complaints of dysuria and vaginal discomfort.  Patient has menses now and believed irritation from tampons.  No fevers.          Review of Systems    Past Medical History:   Diagnosis Date     Gastroesophageal reflux disease without esophagitis 11/22/2022     Motion sickness      Family History   Problem Relation Age of Onset     Emphysema Maternal Grandfather      Mental Illness Paternal Grandfather      Prostate Cancer Paternal Grandfather      Colon Cancer Paternal Grandfather      Current Outpatient Medications   Medication Sig Dispense Refill     ciprofloxacin (CIPRO) 500 MG tablet Take 1 tablet (500 mg) by mouth 2 times daily for 5 days 10 tablet 0     desogestrel-ethinyl estradiol (APRI) 0.15-30 MG-MCG tablet Take 1 tablet by mouth daily 84 tablet 3     escitalopram (LEXAPRO) 5 MG tablet Take 1 tablet (5 mg) by mouth daily 90 tablet 1     fluticasone (FLONASE) 50 MCG/ACT nasal spray Spray 2 sprays into both nostrils daily 15.8 mL 11     ketoconazole (NIZORAL) 2 % external shampoo Apply topically daily To arms, abdomen, legs 360 mL 3     loratadine (CLARITIN) 10 MG tablet Take 10 mg by mouth daily       metroNIDAZOLE (FLAGYL) 500 MG tablet Take 1 tablet (500 mg) by mouth 2 times daily for 7 days 14 tablet 0     pantoprazole (PROTONIX) 20 MG EC tablet TAKE 1 TABLET BY MOUTH EVERY DAY 90 tablet 2     fluticasone propionate (FLONASE) 50 mcg/actuation nasal spray [FLUTICASONE PROPIONATE (FLONASE) 50 MCG/ACTUATION NASAL SPRAY] INSTILL 1 SPRAY INTO EACH NOSTRIL TWICE A DAY 48 g 3     LORazepam (ATIVAN) 0.5 MG tablet Take 1 tablet (0.5 mg) by mouth every 6 hours as needed for  anxiety (Patient not taking: Reported on 2/16/2023) 15 tablet 0     valACYclovir (VALTREX) 500 MG tablet TAKE 1 TABLET BY MOUTH 2 TIMES DAILY FOR 3 DAYS       Social History     Tobacco Use     Smoking status: Never     Smokeless tobacco: Never   Substance Use Topics     Alcohol use: Yes     Comment: 5 drinks per week       OBJECTIVE  /86 (BP Location: Left arm, Patient Position: Sitting, Cuff Size: Adult Regular)   Pulse 80   Temp 98.9  F (37.2  C) (Oral)   Resp 16   LMP 02/15/2023 (Exact Date)   SpO2 98%     Physical Exam  Vitals and nursing note reviewed.   Constitutional:       Appearance: Normal appearance. She is normal weight.   Eyes:      Extraocular Movements: Extraocular movements intact.      Conjunctiva/sclera: Conjunctivae normal.   Cardiovascular:      Rate and Rhythm: Normal rate and regular rhythm.      Pulses: Normal pulses.      Heart sounds: Normal heart sounds.   Pulmonary:      Effort: Pulmonary effort is normal.      Breath sounds: Normal breath sounds.   Abdominal:      Tenderness: There is no right CVA tenderness or left CVA tenderness.   Skin:     General: Skin is warm and dry.   Neurological:      General: No focal deficit present.      Mental Status: She is alert.   Psychiatric:         Mood and Affect: Mood normal.         Behavior: Behavior normal.         Labs:  Results for orders placed or performed in visit on 02/16/23 (from the past 24 hour(s))   UA with Microscopic reflex to Culture - lab collect    Specimen: Urine, Midstream   Result Value Ref Range    Color Urine Red (A) Colorless, Straw, Light Yellow, Yellow    Appearance Urine Slightly Cloudy (A) Clear    Glucose Urine Negative Negative mg/dL    Bilirubin Urine Negative Negative    Ketones Urine Negative Negative mg/dL    Specific Gravity Urine 1.010 1.003 - 1.035    Blood Urine Large (A) Negative    pH Urine 6.0 5.0 - 7.0    Protein Albumin Urine 100 (A) Negative mg/dL    Urobilinogen Urine 0.2 0.2, 1.0 E.U./dL     Nitrite Urine Negative Negative    Leukocyte Esterase Urine Moderate (A) Negative   Wet prep - lab collect    Specimen: Vagina; Swab   Result Value Ref Range    Trichomonas Absent Absent    Yeast Absent Absent    Clue Cells Present (A) Absent    WBCs/high power field 2+ (A) None   Urine Microscopic   Result Value Ref Range    Bacteria Urine None Seen None Seen /HPF    RBC Urine 5-10 (A) 0-2 /HPF /HPF    WBC Urine 10-25 (A) 0-5 /HPF /HPF    Squamous Epithelials Urine Few (A) None Seen /LPF       X-Ray was not done.    ASSESSMENT:      ICD-10-CM    1. Dysuria  R30.0 UA with Microscopic reflex to Culture - lab collect     Wet prep - lab collect     UA with Microscopic reflex to Culture - lab collect     Wet prep - lab collect     Urine Microscopic     Urine Culture      2. BV (bacterial vaginosis)  N76.0 metroNIDAZOLE (FLAGYL) 500 MG tablet    B96.89       3. Acute cystitis with hematuria  N30.01 ciprofloxacin (CIPRO) 500 MG tablet           Medical Decision Making:    Differential Diagnosis:  UTI: UTI, Dysuria and Vaginitis    Serious Comorbid Conditions:  Adult:  reviewed    PLAN:    Rx for cipro and flagyl.  Urine culture pending.  Increase fluid intake. Discussed reasons to seek immediate medical attention - specifically, fevers or vomiting.  Finish all medications.  No etoh while on flagyl.  Recommended probiotics.          Followup:    If not improving or if condition worsens, follow up with your Primary Care Provider, If not improving or if conditions worsens over the next 12-24 hours, go to the Emergency Department    There are no Patient Instructions on file for this visit.

## 2023-02-19 LAB — BACTERIA UR CULT: ABNORMAL

## 2023-03-06 ENCOUNTER — MYC MEDICAL ADVICE (OUTPATIENT)
Dept: FAMILY MEDICINE | Facility: CLINIC | Age: 33
End: 2023-03-06
Payer: COMMERCIAL

## 2023-03-06 NOTE — TELEPHONE ENCOUNTER
PCP- please see mychart:    Please advise if patient should schedule VV to further discuss medication changes or if other recommendations for patient?    Please respond back to patient or send to triage to follow up. If patient needs to be scheduled, please route to team coordinators.    Jaqui Taylor RN  [facility]

## 2023-03-09 ENCOUNTER — VIRTUAL VISIT (OUTPATIENT)
Dept: FAMILY MEDICINE | Facility: CLINIC | Age: 33
End: 2023-03-09
Payer: COMMERCIAL

## 2023-03-09 DIAGNOSIS — T88.7XXA MEDICATION SIDE EFFECTS: ICD-10-CM

## 2023-03-09 DIAGNOSIS — F41.1 GAD (GENERALIZED ANXIETY DISORDER): Primary | ICD-10-CM

## 2023-03-09 PROCEDURE — 99214 OFFICE O/P EST MOD 30 MIN: CPT | Mod: VID | Performed by: INTERNAL MEDICINE

## 2023-03-09 RX ORDER — SERTRALINE HYDROCHLORIDE 25 MG/1
25 TABLET, FILM COATED ORAL DAILY
Qty: 90 TABLET | Refills: 0 | Status: SHIPPED | OUTPATIENT
Start: 2023-03-09 | End: 2023-06-14

## 2023-03-09 ASSESSMENT — ANXIETY QUESTIONNAIRES
7. FEELING AFRAID AS IF SOMETHING AWFUL MIGHT HAPPEN: SEVERAL DAYS
2. NOT BEING ABLE TO STOP OR CONTROL WORRYING: SEVERAL DAYS
5. BEING SO RESTLESS THAT IT IS HARD TO SIT STILL: NOT AT ALL
8. IF YOU CHECKED OFF ANY PROBLEMS, HOW DIFFICULT HAVE THESE MADE IT FOR YOU TO DO YOUR WORK, TAKE CARE OF THINGS AT HOME, OR GET ALONG WITH OTHER PEOPLE?: NOT DIFFICULT AT ALL
6. BECOMING EASILY ANNOYED OR IRRITABLE: SEVERAL DAYS
IF YOU CHECKED OFF ANY PROBLEMS ON THIS QUESTIONNAIRE, HOW DIFFICULT HAVE THESE PROBLEMS MADE IT FOR YOU TO DO YOUR WORK, TAKE CARE OF THINGS AT HOME, OR GET ALONG WITH OTHER PEOPLE: NOT DIFFICULT AT ALL
GAD7 TOTAL SCORE: 5
3. WORRYING TOO MUCH ABOUT DIFFERENT THINGS: SEVERAL DAYS
7. FEELING AFRAID AS IF SOMETHING AWFUL MIGHT HAPPEN: SEVERAL DAYS
GAD7 TOTAL SCORE: 5
4. TROUBLE RELAXING: NOT AT ALL
1. FEELING NERVOUS, ANXIOUS, OR ON EDGE: SEVERAL DAYS
GAD7 TOTAL SCORE: 5

## 2023-03-09 NOTE — PROGRESS NOTES
Harley is a 32 year old who is being evaluated via a billable video visit.      How would you like to obtain your AVS? MyChart  If the video visit is dropped, the invitation should be resent by: Text to cell phone: [phone]  Will anyone else be joining your video visit? No      Assessment & Plan     ABI (generalized anxiety disorder)  zoloft 25 mg daily   - sertraline (ZOLOFT) 25 MG tablet; Take 1 tablet (25 mg) by mouth daily    Medication side effects  lexapro causing sexual dysfunction.      See Patient Instructions    No follow-ups on file.    LUCILA APPLE MD  [facility]    Subjective   Harley is a 32 year old, presenting for the following health issues:  No chief complaint on file.    Harley is a very pleasant 32 year old female who had a virtual visit today.     She is having decreased sexual drive and would like to stop the medication.    She has not tried zoloft in the past.       History of Present Illness       Mental Health Follow-up:  Patient presents to follow-up on Anxiety.    Patient's anxiety since last visit has been:  Better  The patient is not having other symptoms associated with anxiety.  Any significant life events: No  Patient is not feeling anxious or having panic attacks.  Patient has no concerns about alcohol or drug use.    She eats 0-1 servings of fruits and vegetables daily.She consumes 0 sweetened beverage(s) daily.She exercises with enough effort to increase her heart rate 9 or less minutes per day.  She exercises with enough effort to increase her heart rate 3 or less days per week.   She is taking medications regularly.  Today's ABI-7 Score: 5     Review of Systems       Objective       Vitals:  No vitals were obtained today due to virtual visit.    Physical Exam   GEN: No acute distress  RESP: No audible increased work of breathing. Patient speaking in full sentences without distress.  PSYCH: pleasant  Exam otherwise limited due to virtual platform           Video-Visit Details    Type of service:  Video Visit   Originating Location (pt. Location): Other workplace  Distant Location (provider location):  On-site  Platform used for Video Visit: Diane

## 2023-04-03 ENCOUNTER — MYC MEDICAL ADVICE (OUTPATIENT)
Dept: FAMILY MEDICINE | Facility: CLINIC | Age: 33
End: 2023-04-03
Payer: COMMERCIAL

## 2023-04-04 ENCOUNTER — NURSE TRIAGE (OUTPATIENT)
Dept: FAMILY MEDICINE | Facility: CLINIC | Age: 33
End: 2023-04-04
Payer: COMMERCIAL

## 2023-04-04 NOTE — TELEPHONE ENCOUNTER
TO PCP    See [employer] message below. Patient states bleeding increased to 1/2 teaspoon today during morning BM. Some pain, no itching. Has had hemorrhoids before. Blood is bright red.     Disposition states see in clinic in 3 days. Patient only wants to see PCP for this. Can we use next day/same day slot as no other available.    Please advise.     Purvi Hernandez RN on 4/4/2023 at 10:34 AM        Reason for Disposition    Blood in or on bowel movement is main symptom    MILD rectal bleeding (more than just a few drops or streaks)    Additional Information    Negative: Sounds like a life-threatening emergency to the triager    Negative: Passed out (i.e., fainted, collapsed and was not responding)    Negative: Shock suspected (e.g., cold/pale/clammy skin, too weak to stand, low BP, rapid pulse)    Negative: Vomiting red blood or black (coffee ground) material    Negative: Sounds like a life-threatening emergency to the triager    Negative: Diarrhea is main symptom    Negative: Rectal symptoms    Negative: SEVERE rectal bleeding (large blood clots; constant or on and off bleeding)    Negative: SEVERE dizziness (e.g., unable to stand, requires support to walk, feels like passing out now)    Negative: MODERATE rectal bleeding (small blood clots, passing blood without stool, or toilet water turns red) more than once a day    Negative: Bloody, black, or tarry bowel movements  (Exception: Chronic-unchanged black-grey bowel movements and is taking iron pills or Pepto-Bismol.)    Negative: High-risk adult (e.g., prior surgery on aorta, abdominal aortic aneurysm)    Negative: Rectal foreign body (inserted or swallowed)    Negative: SEVERE abdominal pain (e.g., excruciating)    Negative: Constant abdominal pain lasting > 2 hours    Negative: Pale skin (pallor) of new-onset or worsening    Negative: Patient sounds very sick or weak to the triager    Negative: MODERATE rectal bleeding (small blood clots, passing blood without  "stool, or toilet water turns red)    Negative: Taking Coumadin (warfarin) or other strong blood thinner, or known bleeding disorder (e.g., thrombocytopenia)    Negative: Colonoscopy in past 72 hours    Negative: Known cirrhosis of the liver (or history of liver failure or ascites)    Negative: Patient wants to be seen    Answer Assessment - Initial Assessment Questions  1. SYMPTOM:  \"What's the main symptom you're concerned about?\" (e.g., pain, itching, swelling, rash)      Rectal bleeding  2. ONSET: \"When did the rectal bleeding  start?\"      Probably a month  3. RECTAL PAIN: \"Do you have any pain around your rectum?\" \"How bad is the pain?\"  (Scale 0-10; or mild, moderate, severe)    - NONE (0): no pain    - MILD (1-3): doesn't interfere with normal activities     - MODERATE (4-7): interferes with normal activities or awakens from sleep, limping     - SEVERE (8-10): excruciating pain, unable to have a bowel movement       A little  4. RECTAL ITCHING: \"Do you have any itching in this area?\" \"How bad is the itching?\"  (Scale 0-10; or mild, moderate, severe)    - NONE: no itching    - MILD: doesn't interfere with normal activities     - MODERATE-SEVERE: interferes with normal activities or awakens from sleep      No  5. CONSTIPATION: \"Do you have constipation?\" If Yes, ask: \"How bad is it?\"      No  6. CAUSE: \"What do you think is causing the anus symptoms?\"      hemmroids?  7. OTHER SYMPTOMS: \"Do you have any other symptoms?\"  (e.g., abdomen pain, fever, rectal bleeding, vomiting)      Rectal bleeding  8. PREGNANCY: \"Is there any chance you are pregnant?\" \"When was your last menstrual period?\"      No    Answer Assessment - Initial Assessment Questions  1. APPEARANCE of BLOOD: \"What color is it?\" \"Is it passed separately, on the surface of the stool, or mixed in with the stool?\"       Bright red, on the surface  2. AMOUNT: \"How much blood was passed?\"       About a 1/2 a teaspoon today  3. FREQUENCY: \"How many times " "has blood been passed with the stools?\"       Always the first one in the morning.  Daily for the last month  4. ONSET: \"When was the blood first seen in the stools?\" (Days or weeks)       About a month ago  5. DIARRHEA: \"Is there also some diarrhea?\" If Yes, ask: \"How many diarrhea stools in the past 24 hours?\"       No  6. CONSTIPATION: \"Do you have constipation?\" If Yes, ask: \"How bad is it?\"      No  7. RECURRENT SYMPTOMS: \"Have you had blood in your stools before?\" If Yes, ask: \"When was the last time?\" and \"What happened that time?\"       Yes, Hemmorhoids  8. BLOOD THINNERS: \"Do you take any blood thinners?\" (e.g., Coumadin/warfarin, Pradaxa/dabigatran, aspirin)      no  9. OTHER SYMPTOMS: \"Do you have any other symptoms?\"  (e.g., abdomen pain, vomiting, dizziness, fever)      No  10. PREGNANCY: \"Is there any chance you are pregnant?\" \"When was your last menstrual period?\"        No    Protocols used: RECTAL SYMPTOMS-A-OH, RECTAL BLEEDING-A-OH    "

## 2023-04-04 NOTE — TELEPHONE ENCOUNTER
Patient Contact    Attempt # 1    Was call answered?  Yes.  Pt verified name and date of birth. Scheduled;    Future Appointments 4/4/2023 - 10/1/2023      Date Visit Type Length Department Provider     4/6/2023  8:30 AM OFFICE VISIT 30 min CS [facility]/Ramandeep Bai MD    Location Instructions:     [facility] is in [address] of the [facility] at [address] This is just south of [facility] and the [facility] exit off of [address] Free parking is available; access the lot by turning east from [facility] onto [address] [place] [address]. Through the main entrance, the clinic is directly to the left.

## 2023-04-04 NOTE — TELEPHONE ENCOUNTER
"[facility]      \"Harley Quarles  P Cs Triage Im (supporting Ramandeep Woods MD) Yesterday (9:29 AM)     YOKASTA Gonzalez, Dr. Woods--  I ve noticed some bleeding after pooping for awhile now  it s always bright red and not a lot, and I m guessing it s hemorrhoids related because I was pooping A LOT after taking antibiotics back in Feb, but I m getting worried just because it s lasting so long. I just want to make sure it isn t anything serious. I tried to schedule an appointment with you, but it looks like your next available appointment is in July- is there ANY way I could get in earlier? Please let me know.  Thank you!  Harley Quarles\"     "

## 2023-04-06 ENCOUNTER — OFFICE VISIT (OUTPATIENT)
Dept: FAMILY MEDICINE | Facility: CLINIC | Age: 33
End: 2023-04-06
Payer: COMMERCIAL

## 2023-04-06 VITALS
WEIGHT: 122.7 LBS | TEMPERATURE: 98.9 F | HEART RATE: 85 BPM | RESPIRATION RATE: 19 BRPM | SYSTOLIC BLOOD PRESSURE: 110 MMHG | DIASTOLIC BLOOD PRESSURE: 80 MMHG | HEIGHT: 67 IN | OXYGEN SATURATION: 97 % | BODY MASS INDEX: 19.26 KG/M2

## 2023-04-06 DIAGNOSIS — K92.1 BLOOD IN STOOL: Primary | ICD-10-CM

## 2023-04-06 DIAGNOSIS — K64.4 EXTERNAL HEMORRHOIDS: ICD-10-CM

## 2023-04-06 PROCEDURE — 99213 OFFICE O/P EST LOW 20 MIN: CPT | Performed by: INTERNAL MEDICINE

## 2023-04-06 ASSESSMENT — ENCOUNTER SYMPTOMS: HEMATOCHEZIA: 1

## 2023-04-06 ASSESSMENT — PAIN SCALES - GENERAL: PAINLEVEL: NO PAIN (0)

## 2023-04-06 NOTE — PROGRESS NOTES
Assessment & Plan     Blood in stool  This is most likely from external hemorrhoids.  Discussed with patient about early colon cancer screening in patients with increased risk of colon cancer which includes first-degree relatives.  For her we can start early colon cancer screening at age 40.  Discussed with patient to return to clinic if measures to prevent blood in stool or treatment of external hemorrhoids does not improve.    External hemorrhoids  Discussed about sitz bath's, stool softener like psyllium husk and taking a stool softener like MiraLAX if stools are hard and she has to strain.       See Patient Instructions    LUCILA APPLE MD  [facility] Souza is a 32 year old, presenting for the following health issues:  Rectal Bleeding    Harley is a very nice 32-year-old female who presented to the clinic with acute concerns.    She had a diarrheal illness a month ago followed by heart stool after which she states she started having blood in stool.  Blood is usually found on wiping or in the toilet.  It is never mixed with stool.  She does report straining a little bit and has to stay longer on the toilet seat.  She reports having a history of hemorrhoids in the past  She has history of colon cancer in paternal grandfather.  Father has no history of colon cancer or polyps.  On exam, there are 2 hemorrhoids present which are not thrombosed.      Rectal Bleeding    History of Present Illness       Reason for visit:  Rectal/anal bleeding  Symptom onset:  3-4 weeks ago  Symptoms include:  Bleeding almost every first BM of the day  Symptom intensity:  Mild  Symptom progression:  Staying the same  Had these symptoms before:  Yes  Has tried/received treatment for these symptoms:  No  What makes it worse:  N/A  What makes it better:  Not pooping?    She eats 0-1 servings of fruits and vegetables daily.She consumes 0 sweetened beverage(s) daily.She exercises with enough effort to  "increase her heart rate 9 or less minutes per day.  She exercises with enough effort to increase her heart rate 3 or less days per week.   She is taking medications regularly.       Review of Systems   Gastrointestinal: Positive for hematochezia.          Objective    /80 (BP Location: Left arm, Patient Position: Sitting, Cuff Size: Adult Regular)   Pulse 85   Temp 98.9  F (37.2  C) (Oral)   Resp 19   Ht 1.702 m (5' 7.01\")   Wt 55.7 kg (122 lb 11.2 oz)   LMP 04/06/2023 (Approximate)   SpO2 97%   BMI 19.21 kg/m    Body mass index is 19.21 kg/m .  Physical Exam               "

## 2023-06-14 DIAGNOSIS — F41.1 GAD (GENERALIZED ANXIETY DISORDER): ICD-10-CM

## 2023-06-14 RX ORDER — SERTRALINE HYDROCHLORIDE 25 MG/1
TABLET, FILM COATED ORAL
Qty: 90 TABLET | Refills: 2 | Status: SHIPPED | OUTPATIENT
Start: 2023-06-14 | End: 2024-03-04

## 2023-06-14 NOTE — TELEPHONE ENCOUNTER
Prescription approved per [facility] Refill Protocol.  Lauren Fam, RN  [facility] Triage Nurse

## 2023-11-09 DIAGNOSIS — Z30.40 ENCOUNTER FOR SURVEILLANCE OF CONTRACEPTIVES, UNSPECIFIED CONTRACEPTIVE: ICD-10-CM

## 2023-11-09 RX ORDER — DESOGESTREL AND ETHINYL ESTRADIOL 0.15-0.03
1 KIT ORAL DAILY
Qty: 84 TABLET | Refills: 0 | Status: SHIPPED | OUTPATIENT
Start: 2023-11-09 | End: 2023-12-11

## 2023-11-22 ENCOUNTER — MYC REFILL (OUTPATIENT)
Dept: FAMILY MEDICINE | Facility: CLINIC | Age: 33
End: 2023-11-22
Payer: COMMERCIAL

## 2023-11-22 DIAGNOSIS — B00.9 HERPES SIMPLEX VIRUS INFECTION: Primary | ICD-10-CM

## 2023-11-22 RX ORDER — VALACYCLOVIR HYDROCHLORIDE 500 MG/1
500 TABLET, FILM COATED ORAL 2 TIMES DAILY
OUTPATIENT
Start: 2023-11-22

## 2023-11-22 NOTE — TELEPHONE ENCOUNTER
valACYclovir (VALTREX) 500 MG tablet          Sig: Take 1 tablet (500 mg) by mouth 2 times daily FOR 3 DAYS    Disp: Not specified    Refills:    Start: 11/22/2023    Class: E-Prescribe    Refused by: Ethan Moctezuma MD    Refusal reason: Patient needs appointment    Non-formulary        [id number] [place] [address] [facility] [address] [facility] [address]

## 2023-11-24 RX ORDER — VALACYCLOVIR HYDROCHLORIDE 500 MG/1
500 TABLET, FILM COATED ORAL 2 TIMES DAILY
Qty: 6 TABLET | Refills: 1 | Status: SHIPPED | OUTPATIENT
Start: 2023-11-24 | End: 2024-06-17

## 2023-11-24 NOTE — TELEPHONE ENCOUNTER
Pt did schedule appt with Dr. Woods.  Has had this med prescribed before through [facility].  See mc message from pt.  Yany Grier RN  [facility] RN Triage Team

## 2023-12-01 ENCOUNTER — NURSE TRIAGE (OUTPATIENT)
Dept: NURSING | Facility: CLINIC | Age: 33
End: 2023-12-01
Payer: COMMERCIAL

## 2023-12-01 ENCOUNTER — TELEPHONE (OUTPATIENT)
Dept: FAMILY MEDICINE | Facility: CLINIC | Age: 33
End: 2023-12-01
Payer: COMMERCIAL

## 2023-12-01 ENCOUNTER — TELEPHONE (OUTPATIENT)
Dept: OTOLARYNGOLOGY | Facility: CLINIC | Age: 33
End: 2023-12-01
Payer: COMMERCIAL

## 2023-12-01 NOTE — TELEPHONE ENCOUNTER
Spoke with Harley about her poking feeling on the right side of the nose from a stent from a past surgery by [place].  Pt feels that something is poking or stuck in her scar tissue.  No  pain, airway is fine just feeling of annoying.  Got pt scheduled with ENT follow up.    [facility]      Rossana Monreal RN  [facility]  ENT  [address]  [place] [address]  Office:[phone]  Employed by [facility]

## 2023-12-01 NOTE — TELEPHONE ENCOUNTER
Nurse Triage SBAR    Is this a 2nd Level Triage? YES, LICENSED PRACTITIONER REVIEW IS REQUIRED    Situation:  History of Polypectomy- January 2022   she thinks  she has a remnant of Propel dissolvable stent in back of RIGHT nare.  -Right sided sensation of hard object which she believes is a stent  Coughed up one stent ~ 6 weeks postop.  She had surgery with  Dr Boykin via [facility].   Last visit 9/16/22 Apparently Dr Boykin is no longer at the clinic.      She has noted occasional foul taste or odor and has had rubbery small amounts of tissue like substance.  No fever, tSS sx.    Background: 1/25/22 : OP REPORT Jayna Boykin:  [facility]    Procedure(s):  Bilateral functional endoscopic sinus surgery including maxillary antrostomies with tissue removal, total ethmoidectomies, left frontal sinusotomy   Propel stents were placed into the operative fields bilaterally and hemostasis was achieved with Surgicel powder   Assessment: nasal endoscopy recommended ASAP  for evaluation of foreign body/ possible surgical remnant. [facility] defers to original surgical office.         Routed to provider

## 2023-12-01 NOTE — TELEPHONE ENCOUNTER
Nurse Triage SBAR    Is this a 2nd Level Triage? NO    Situation: Patient calling about a possible stent being stuck in the back of her sinuses/throat  Consent: not needed    Background: History of Polypectomy- January 2022    Assessment:   had a polyp ectomy and had stents up- thinks that she has a stent migrating in her sinuses  Indicates that she feels like she has passed some scar tissue- 3 separate occasions- white and a texture of a rubber band  Indicates that she feels like something is stuck in the back of her nose almost into her throat- indicates that she feels like she can almost taste something different  Started noticing the poking in her throat this week    Able to breathe well      Protocol Recommended Disposition:       Recommendation: Assisted in connecting with ENT office directly for additional guidance      Speciality clinic    Does the patient meet one of the following criteria for ADS visit consideration? No      Alda Valles RN 10:32 AM 12/1/2023

## 2023-12-01 NOTE — TELEPHONE ENCOUNTER
Reason for Disposition    [1] Suspected FB AND [2] yellow nasal discharge    Additional Information    Negative: SEVERE difficulty breathing (e.g., struggling for each breath, speaks in single words, stridor)    Negative: Sounds like a life-threatening emergency to the triager    Negative: Sharp FB    Negative: Button battery FB    Negative: Bleeding from nose    Negative: Severe nose pain    Negative: [1] Caller unable to remove FB AND [2] not removed after using Care Advice    Protocols used: Nose - Foreign Body-A-AH

## 2023-12-01 NOTE — TELEPHONE ENCOUNTER
Patient states that she can feel the stent in her sinus from her polyp removal surgery. Patient states that she can feel it poking the back of the throat.   Patient agrees to contact ENT now for recommended follow up.Ely Richards RN

## 2023-12-10 ASSESSMENT — ENCOUNTER SYMPTOMS
COUGH: 0
ARTHRALGIAS: 0
PALPITATIONS: 0
BREAST MASS: 0
ABDOMINAL PAIN: 0
EYE PAIN: 0
DIZZINESS: 0
HEADACHES: 0
HEMATOCHEZIA: 0
HEARTBURN: 0
HEMATURIA: 0
NERVOUS/ANXIOUS: 0
WEAKNESS: 0
MYALGIAS: 0
CHILLS: 0
JOINT SWELLING: 0
SORE THROAT: 0
FEVER: 0
DYSURIA: 0
PARESTHESIAS: 0
SHORTNESS OF BREATH: 0
NAUSEA: 0
FREQUENCY: 0
CONSTIPATION: 0
DIARRHEA: 0

## 2023-12-11 ENCOUNTER — OFFICE VISIT (OUTPATIENT)
Dept: FAMILY MEDICINE | Facility: CLINIC | Age: 33
End: 2023-12-11
Payer: COMMERCIAL

## 2023-12-11 VITALS
WEIGHT: 122.3 LBS | SYSTOLIC BLOOD PRESSURE: 118 MMHG | HEART RATE: 93 BPM | RESPIRATION RATE: 14 BRPM | HEIGHT: 67 IN | OXYGEN SATURATION: 95 % | BODY MASS INDEX: 19.19 KG/M2 | DIASTOLIC BLOOD PRESSURE: 82 MMHG | TEMPERATURE: 99.3 F

## 2023-12-11 DIAGNOSIS — J33.9 NASAL POLYP: ICD-10-CM

## 2023-12-11 DIAGNOSIS — Z00.00 ANNUAL PHYSICAL EXAM: Primary | ICD-10-CM

## 2023-12-11 DIAGNOSIS — J30.9 ALLERGIC RHINITIS, UNSPECIFIED SEASONALITY, UNSPECIFIED TRIGGER: ICD-10-CM

## 2023-12-11 DIAGNOSIS — F41.1 GAD (GENERALIZED ANXIETY DISORDER): ICD-10-CM

## 2023-12-11 DIAGNOSIS — F41.9 ACUTE ANXIETY: ICD-10-CM

## 2023-12-11 DIAGNOSIS — Z30.40 ENCOUNTER FOR SURVEILLANCE OF CONTRACEPTIVES, UNSPECIFIED CONTRACEPTIVE: ICD-10-CM

## 2023-12-11 PROBLEM — R07.89 CHEST WALL DISCOMFORT: Status: RESOLVED | Noted: 2022-11-22 | Resolved: 2023-12-11

## 2023-12-11 LAB
ERYTHROCYTE [DISTWIDTH] IN BLOOD BY AUTOMATED COUNT: 12.4 % (ref 10–15)
HCT VFR BLD AUTO: 42.2 % (ref 35–47)
HGB BLD-MCNC: 13.3 G/DL (ref 11.7–15.7)
MCH RBC QN AUTO: 29.2 PG (ref 26.5–33)
MCHC RBC AUTO-ENTMCNC: 31.5 G/DL (ref 31.5–36.5)
MCV RBC AUTO: 93 FL (ref 78–100)
PLATELET # BLD AUTO: 273 10E3/UL (ref 150–450)
RBC # BLD AUTO: 4.56 10E6/UL (ref 3.8–5.2)
WBC # BLD AUTO: 5.3 10E3/UL (ref 4–11)

## 2023-12-11 PROCEDURE — 85027 COMPLETE CBC AUTOMATED: CPT | Performed by: INTERNAL MEDICINE

## 2023-12-11 PROCEDURE — 99395 PREV VISIT EST AGE 18-39: CPT | Performed by: INTERNAL MEDICINE

## 2023-12-11 PROCEDURE — 80048 BASIC METABOLIC PNL TOTAL CA: CPT | Performed by: INTERNAL MEDICINE

## 2023-12-11 PROCEDURE — 80061 LIPID PANEL: CPT | Performed by: INTERNAL MEDICINE

## 2023-12-11 PROCEDURE — 99214 OFFICE O/P EST MOD 30 MIN: CPT | Mod: 25 | Performed by: INTERNAL MEDICINE

## 2023-12-11 PROCEDURE — 36415 COLL VENOUS BLD VENIPUNCTURE: CPT | Performed by: INTERNAL MEDICINE

## 2023-12-11 RX ORDER — FLUTICASONE PROPIONATE 50 MCG
1 SPRAY, SUSPENSION (ML) NASAL DAILY
Qty: 18.2 ML | Refills: 3 | Status: SHIPPED | OUTPATIENT
Start: 2023-12-11 | End: 2024-07-29

## 2023-12-11 RX ORDER — DESOGESTREL AND ETHINYL ESTRADIOL 0.15-0.03
1 KIT ORAL DAILY
Qty: 84 TABLET | Refills: 4 | Status: SHIPPED | OUTPATIENT
Start: 2023-12-11

## 2023-12-11 RX ORDER — LORAZEPAM 0.5 MG/1
0.5 TABLET ORAL EVERY 6 HOURS PRN
Qty: 15 TABLET | Refills: 0 | Status: SHIPPED | OUTPATIENT
Start: 2023-12-11

## 2023-12-11 ASSESSMENT — ENCOUNTER SYMPTOMS
HEMATOCHEZIA: 0
CONSTIPATION: 0
CHILLS: 0
FEVER: 0
FREQUENCY: 0
HEADACHES: 0
ABDOMINAL PAIN: 0
DIARRHEA: 0
DIZZINESS: 0
HEARTBURN: 0
NAUSEA: 0
PALPITATIONS: 0
WEAKNESS: 0
SHORTNESS OF BREATH: 0
NERVOUS/ANXIOUS: 0
EYE PAIN: 0
BREAST MASS: 0
JOINT SWELLING: 0
SORE THROAT: 0
PARESTHESIAS: 0
ARTHRALGIAS: 0
HEMATURIA: 0
DYSURIA: 0
MYALGIAS: 0
COUGH: 0

## 2023-12-11 ASSESSMENT — PAIN SCALES - GENERAL: PAINLEVEL: NO PAIN (0)

## 2023-12-11 NOTE — PROGRESS NOTES
SUBJECTIVE:   Harley is a 33 year old, presenting for the following:  Physical    Healthy Habits:     Getting at least 3 servings of Calcium per day:  NO    Bi-annual eye exam:  Yes    Dental care twice a year:  Yes    Sleep apnea or symptoms of sleep apnea:  None    Diet:  Regular (no restrictions)    Frequency of exercise:  2-3 days/week    Duration of exercise:  15-30 minutes    Taking medications regularly:  Yes    Barriers to taking medications:  None    Medication side effects:  Not applicable    Additional concerns today:  No    Harley is here for E  She has no concerns today.  UTD with pap  Grandfather paternal with colon cancer after 65. Mother had melanoma.   Declined vaccines       Social History     Tobacco Use     Smoking status: Never     Smokeless tobacco: Never   Substance Use Topics     Alcohol use: Yes     Comment: 5 drinks per week         12/10/2023     1:43 PM   Alcohol Use   Prescreen: >3 drinks/day or >7 drinks/week? No       Reviewed orders with patient.  Reviewed health maintenance and updated orders accordingly - Yes  Lab work is in process    Breast Cancer Screening:    FHS-7:       11/15/2021    11:19 AM 12/7/2021     9:11 AM 1/14/2022    11:37 AM 11/21/2022    10:03 AM 12/10/2023     1:47 PM   Breast CA Risk Assessment (FHS-7)   Did any of your first-degree relatives have breast or ovarian cancer? No    No    No No No No No   Did any of your relatives have bilateral breast cancer? No    No    No No No No No   Did any man in your family have breast cancer? No    No    No No No No No   Did any woman in your family have breast and ovarian cancer? No    No    No No No No No   Did any woman in your family have breast cancer before age 50 y? No    No    No No No No No   Do you have 2 or more relatives with breast and/or ovarian cancer? No    No    No No No No No   Do you have 2 or more relatives with breast and/or bowel cancer? No    No    No No No No No     Patient under 40 years of age:  "Routine Mammogram Screening not recommended.   Pertinent mammograms are reviewed under the imaging tab.    History of abnormal Pap smear: NO - age 30-65 PAP every 5 years with negative HPV co-testing recommended      Latest Ref Rng & Units 12/9/2021     2:08 PM 10/29/2018     3:49 PM   PAP / HPV   PAP  Negative for Intraepithelial Lesion or Malignancy (NILM)  Negative for squamous intraepithelial lesion or malignancy  Electronically signed by Kathleen Majano CT (ASCP) on 11/5/2018 at 11:48 AM      HPV 16 DNA Negative Negative  Negative    HPV 18 DNA Negative Negative  Negative    Other HR HPV Negative Negative  Negative      Reviewed and updated as needed this visit by clinical staff   Tobacco  Allergies  Meds              Reviewed and updated as needed this visit by Provider                 Review of Systems   Constitutional:  Negative for chills and fever.   HENT:  Negative for congestion, ear pain, hearing loss and sore throat.    Eyes:  Negative for pain and visual disturbance.   Respiratory:  Negative for cough and shortness of breath.    Cardiovascular:  Negative for chest pain, palpitations and peripheral edema.   Gastrointestinal:  Negative for abdominal pain, constipation, diarrhea, heartburn, hematochezia and nausea.   Breasts:  Negative for tenderness, breast mass and discharge.   Genitourinary:  Negative for dysuria, frequency, genital sores, hematuria, pelvic pain, urgency, vaginal bleeding and vaginal discharge.   Musculoskeletal:  Negative for arthralgias, joint swelling and myalgias.   Skin:  Negative for rash.   Neurological:  Negative for dizziness, weakness, headaches and paresthesias.   Psychiatric/Behavioral:  Negative for mood changes. The patient is not nervous/anxious.      OBJECTIVE:   /82 (BP Location: Right arm, Patient Position: Sitting, Cuff Size: Adult Regular)   Pulse 93   Temp 99.3  F (37.4  C) (Oral)   Resp 14   Ht 1.71 m (5' 7.32\")   Wt 55.5 kg (122 lb 4.8 oz)   " SpO2 95%   BMI 18.97 kg/m    Physical Exam  Vitals reviewed.   Constitutional:       Appearance: Normal appearance.   HENT:      Right Ear: Tympanic membrane normal. There is no impacted cerumen.      Left Ear: Tympanic membrane normal. There is no impacted cerumen.      Mouth/Throat:      Mouth: Mucous membranes are moist.      Pharynx: Oropharynx is clear. No oropharyngeal exudate or posterior oropharyngeal erythema.   Cardiovascular:      Rate and Rhythm: Normal rate and regular rhythm.      Heart sounds: Normal heart sounds. No murmur heard.     No gallop.   Pulmonary:      Effort: Pulmonary effort is normal. No respiratory distress.      Breath sounds: Normal breath sounds. No stridor. No wheezing, rhonchi or rales.   Chest:   Breasts:     Right: No swelling, bleeding, inverted nipple, mass, nipple discharge, skin change or tenderness.      Left: No swelling, bleeding, inverted nipple, mass, nipple discharge, skin change or tenderness.   Abdominal:      General: Abdomen is flat. There is no distension.      Palpations: Abdomen is soft. There is no mass.      Tenderness: There is no abdominal tenderness. There is no guarding.      Hernia: No hernia is present.   Musculoskeletal:         General: Normal range of motion.      Cervical back: Normal range of motion and neck supple. No rigidity or tenderness.      Right lower leg: No edema.      Left lower leg: No edema.   Lymphadenopathy:      Cervical: No cervical adenopathy.   Skin:     General: Skin is warm and dry.      Comments: Numerous mole and cherry angiomas   Neurological:      General: No focal deficit present.      Mental Status: She is alert.   Psychiatric:         Mood and Affect: Mood normal.         ASSESSMENT/PLAN:   Harley was seen today for physical.    Diagnoses and all orders for this visit:    Annual physical exam  -     Basic metabolic panel; Future  -     CBC with Platelets (Today); Future  -     Lipid Profile; Future    Encounter for  surveillance of contraceptives, unspecified contraceptive  -     desogestrel-ethinyl estradiol (APRI) 0.15-30 MG-MCG tablet; Take 1 tablet by mouth daily    Nasal polyp  Allergic rhinitis, unspecified seasonality, unspecified trigger  Stable. Continue medication.  -     fluticasone (FLONASE) 50 MCG/ACT nasal spray; Spray 1 spray into both nostrils daily    ABI (generalized anxiety disorder)  Acute anxiety  Continue zoloft.   Ativan as needed  -     LORazepam (ATIVAN) 0.5 MG tablet; Take 1 tablet (0.5 mg) by mouth every 6 hours as needed for anxiety        Patient has been advised of split billing requirements and indicates understanding: Yes      COUNSELING:  Reviewed preventive health counseling, as reflected in patient instructions       Contraception        She reports that she has never smoked. She has never used smokeless tobacco.          Ramandeep Woods MD  [facility]

## 2023-12-12 ENCOUNTER — OFFICE VISIT (OUTPATIENT)
Dept: OTOLARYNGOLOGY | Facility: CLINIC | Age: 33
End: 2023-12-12
Payer: COMMERCIAL

## 2023-12-12 DIAGNOSIS — J33.9 NASAL POLYPOSIS: Primary | ICD-10-CM

## 2023-12-12 DIAGNOSIS — J34.89 NASAL CRUSTING: ICD-10-CM

## 2023-12-12 LAB
ANION GAP SERPL CALCULATED.3IONS-SCNC: 9 MMOL/L (ref 7–15)
BUN SERPL-MCNC: 9.4 MG/DL (ref 6–20)
CALCIUM SERPL-MCNC: 9.1 MG/DL (ref 8.6–10)
CHLORIDE SERPL-SCNC: 106 MMOL/L (ref 98–107)
CHOLEST SERPL-MCNC: 201 MG/DL
CREAT SERPL-MCNC: 0.83 MG/DL (ref 0.51–0.95)
DEPRECATED HCO3 PLAS-SCNC: 23 MMOL/L (ref 22–29)
EGFRCR SERPLBLD CKD-EPI 2021: >90 ML/MIN/1.73M2
FASTING STATUS PATIENT QL REPORTED: NO
GLUCOSE SERPL-MCNC: 82 MG/DL (ref 70–99)
HDLC SERPL-MCNC: 95 MG/DL
LDLC SERPL CALC-MCNC: 83 MG/DL
NONHDLC SERPL-MCNC: 106 MG/DL
POTASSIUM SERPL-SCNC: 4.5 MMOL/L (ref 3.4–5.3)
SODIUM SERPL-SCNC: 138 MMOL/L (ref 135–145)
TRIGL SERPL-MCNC: 115 MG/DL

## 2023-12-12 PROCEDURE — 31231 NASAL ENDOSCOPY DX: CPT | Performed by: OTOLARYNGOLOGY

## 2023-12-12 PROCEDURE — 99213 OFFICE O/P EST LOW 20 MIN: CPT | Mod: 25 | Performed by: OTOLARYNGOLOGY

## 2023-12-12 RX ORDER — METHYLPREDNISOLONE 4 MG
TABLET, DOSE PACK ORAL
Qty: 21 TABLET | Refills: 0 | Status: SHIPPED | OUTPATIENT
Start: 2023-12-28

## 2023-12-12 RX ORDER — PREDNISONE 10 MG/1
10 TABLET ORAL 2 TIMES DAILY
Qty: 28 TABLET | Refills: 0 | Status: SHIPPED | OUTPATIENT
Start: 2023-12-13 | End: 2023-12-27

## 2023-12-12 RX ORDER — MUPIROCIN 20 MG/G
OINTMENT TOPICAL
Qty: 22 G | Refills: 0 | Status: SHIPPED | OUTPATIENT
Start: 2023-12-12

## 2023-12-12 NOTE — NURSING NOTE
Sino-Nasal Outcome Test (SNOT - 22)    1. Need to Blow Nose: Moderate  2. Nasal Blockage: Mild or slight  3. Sneezing: Very mild  4. Runny Nose: Mild or slight  5. Cough: None  6. Post-nasal discharge: Mild or slight  7. Thick nasal discharge: Mild or slight  8. Ear fullness: Mild or slight  9. Dizziness: None  10. Ear Pain: None  11. Facial pain/pressure: None  12. Decreased Sense of Smell/Taste: Very mild  13. Difficulty falling asleep: None  14. Wake up at night: None  15. Lack of a good night's sleep: None  16. Wake up tired: None  17. Fatigue: None  18. Reduced Productivity: None  19. Reduced Concentration: None  20. Frustrated/restless/irritable: None  21. Sad: None  22. Embarrassed: None    Total Score: 15    COPYRIGHT 1996. [facility] [place] . [facility],[place]

## 2023-12-12 NOTE — LETTER
12/12/2023         RE: Harley Quarles  [address] [address]        Dear Colleague,    Thank you for referring your patient, Harley Quarles, to the [facility]. Please see a copy of my visit note below.    CHIEF COMPLAINT:  Patient presents with:  Nose Problem: Surgery was 1/22 pt feels like there is a stent in here nose from surgery. Pt reports that she is having a poking feeling and when she blows her nose she has a feeling that something is in her nose that wont come out. Feels that she passing or blowing scar tissue and is having green in color nasal discharge.          HISTORY OF PRESENT ILLNESS    Harley was seen in follow up after previous 9/16/2022 visit for recheck.    Sino-Nasal Outcome Test (SNOT - 22)    1. Need to Blow Nose: Moderate  2. Nasal Blockage: Mild or slight  3. Sneezing: Very mild  4. Runny Nose: Mild or slight  5. Cough: None  6. Post-nasal discharge: Mild or slight  7. Thick nasal discharge: Mild or slight  8. Ear fullness: Mild or slight  9. Dizziness: None  10. Ear Pain: None  11. Facial pain/pressure: None  12. Decreased Sense of Smell/Taste: Very mild  13. Difficulty falling asleep: None  14. Wake up at night: None  15. Lack of a good night's sleep: None  16. Wake up tired: None  17. Fatigue: None  18. Reduced Productivity: None  19. Reduced Concentration: None  20. Frustrated/restless/irritable: None  21. Sad: None  22. Embarrassed: None    Total Score: 15    COPYRIGHT 1996. [facility] [place]         REVIEW OF SYSTEMS    Review of Systems: a 10-system review is reviewed at this encounter.  See scanned document.       Allergies   Allergen Reactions     Amoxicillin Unknown     Nitrofurantoin Rash     Very small area of arm     Other Environmental Allergy Other (See Comments)     Stuffy nose,itchy eyes     Penicillins Rash           PHYSICAL EXAM:        HEAD: Normal appearance and symmetry:  No cutaneous lesions.         NASAL ENDOSCOPY    Dorsum:   straight  Septum: slight deviation LEFT  Middle meatus:  clear on right; obstructed on left  Turbinates:  reduced; friable on LEFT                     ORAL CAVITY/OROPHARYNX:    Lips:  Normal.     NECK:  Trachea:  midline     NEURO:   Alert and Oriented    GAIT AND STATION:  normal     RESPIRATORY:   Symmetry and Respiratory effort    PSYCH:   normal mood and affect    SKIN:  warm and dry         IMPRESSION:   Encounter Diagnoses   Name Primary?     Nasal polyposis Yes     Nasal crusting          RECOMMENDATIONS:    No orders of the defined types were placed in this encounter.     Orders Placed This Encounter   Medications     predniSONE (DELTASONE) 10 MG tablet     Sig: Take 1 tablet (10 mg) by mouth 2 times daily for 14 days     Dispense:  28 tablet     Refill:  0     methylPREDNISolone (MEDROL DOSEPAK) 4 MG tablet therapy pack     Sig: Follow Package Directions     Dispense:  21 tablet     Refill:  0     mupirocin (BACTROBAN) 2 % external ointment     Sig: Apply a pea sized amount to inside of each nostril 3x daily with Qtip for 10 days     Dispense:  22 g     Refill:  0     Orders Placed This Encounter   Procedures     Nasal Endoscopy, Diag     Adult Allergy/Asthma  Referral        Return visit 6 months for nasal endoscopy.       Again, thank you for allowing me to participate in the care of your patient.        Sincerely,        Joe Wheeler MD

## 2023-12-12 NOTE — PROGRESS NOTES
CHIEF COMPLAINT:  Patient presents with:  Nose Problem: Surgery was 1/22 pt feels like there is a stent in here nose from surgery. Pt reports that she is having a poking feeling and when she blows her nose she has a feeling that something is in her nose that wont come out. Feels that she passing or blowing scar tissue and is having green in color nasal discharge.          HISTORY OF PRESENT ILLNESS    Harley was seen in follow up after previous 9/16/2022 visit for recheck.    Sino-Nasal Outcome Test (SNOT - 22)    1. Need to Blow Nose: Moderate  2. Nasal Blockage: Mild or slight  3. Sneezing: Very mild  4. Runny Nose: Mild or slight  5. Cough: None  6. Post-nasal discharge: Mild or slight  7. Thick nasal discharge: Mild or slight  8. Ear fullness: Mild or slight  9. Dizziness: None  10. Ear Pain: None  11. Facial pain/pressure: None  12. Decreased Sense of Smell/Taste: Very mild  13. Difficulty falling asleep: None  14. Wake up at night: None  15. Lack of a good night's sleep: None  16. Wake up tired: None  17. Fatigue: None  18. Reduced Productivity: None  19. Reduced Concentration: None  20. Frustrated/restless/irritable: None  21. Sad: None  22. Embarrassed: None    Total Score: 15    COPYRIGHT 1996. [facility] [place]         REVIEW OF SYSTEMS    Review of Systems: a 10-system review is reviewed at this encounter.  See scanned document.       Allergies   Allergen Reactions    Amoxicillin Unknown    Nitrofurantoin Rash     Very small area of arm    Other Environmental Allergy Other (See Comments)     Stuffy nose,itchy eyes    Penicillins Rash           PHYSICAL EXAM:        HEAD: Normal appearance and symmetry:  No cutaneous lesions.        NASAL ENDOSCOPY    Dorsum:   straight  Septum: slight deviation LEFT  Middle meatus:  clear on right; obstructed on left  Turbinates:  reduced; friable on LEFT                     ORAL CAVITY/OROPHARYNX:    Lips:  Normal.     NECK:  Trachea:  midline      NEURO:   Alert and Oriented    GAIT AND STATION:  normal     RESPIRATORY:   Symmetry and Respiratory effort    PSYCH:   normal mood and affect    SKIN:  warm and dry         IMPRESSION:   Encounter Diagnoses   Name Primary?    Nasal polyposis Yes    Nasal crusting          RECOMMENDATIONS:    No orders of the defined types were placed in this encounter.     Orders Placed This Encounter   Medications    predniSONE (DELTASONE) 10 MG tablet     Sig: Take 1 tablet (10 mg) by mouth 2 times daily for 14 days     Dispense:  28 tablet     Refill:  0    methylPREDNISolone (MEDROL DOSEPAK) 4 MG tablet therapy pack     Sig: Follow Package Directions     Dispense:  21 tablet     Refill:  0    mupirocin (BACTROBAN) 2 % external ointment     Sig: Apply a pea sized amount to inside of each nostril 3x daily with Qtip for 10 days     Dispense:  22 g     Refill:  0     Orders Placed This Encounter   Procedures    Nasal Endoscopy, Diag    Adult Allergy/Asthma  Referral        Return visit 6 months for nasal endoscopy.

## 2024-03-03 DIAGNOSIS — F41.1 GAD (GENERALIZED ANXIETY DISORDER): ICD-10-CM

## 2024-03-04 RX ORDER — SERTRALINE HYDROCHLORIDE 25 MG/1
TABLET, FILM COATED ORAL
Qty: 30 TABLET | Refills: 5 | Status: SHIPPED | OUTPATIENT
Start: 2024-03-04 | End: 2024-09-02

## 2024-04-25 ENCOUNTER — OFFICE VISIT (OUTPATIENT)
Dept: ALLERGY | Facility: CLINIC | Age: 34
End: 2024-04-25
Attending: OTOLARYNGOLOGY
Payer: COMMERCIAL

## 2024-04-25 VITALS
HEART RATE: 92 BPM | OXYGEN SATURATION: 96 % | DIASTOLIC BLOOD PRESSURE: 90 MMHG | BODY MASS INDEX: 19.08 KG/M2 | SYSTOLIC BLOOD PRESSURE: 136 MMHG | WEIGHT: 123 LBS

## 2024-04-25 DIAGNOSIS — R09.81 NASAL CONGESTION: Primary | ICD-10-CM

## 2024-04-25 DIAGNOSIS — J33.9 NASAL POLYPOSIS: ICD-10-CM

## 2024-04-25 PROCEDURE — 99204 OFFICE O/P NEW MOD 45 MIN: CPT | Performed by: INTERNAL MEDICINE

## 2024-04-25 RX ORDER — PREDNISONE 10 MG/1
TABLET ORAL
Qty: 21 TABLET | Refills: 0 | Status: SHIPPED | OUTPATIENT
Start: 2024-04-25

## 2024-04-25 NOTE — PROGRESS NOTES
Harley Quarles was seen in the Allergy Clinic at [facility].    Harley Quarles is a 33 year old female being seen today at the request of Joe Wheeler MD/[facility] in consultation for nasal polyposis.    She has a history of nasal polyps.  She had a sinus surgery January 2022.  CT scan in 2021 showed extensive polyp disease.  She had good relief of symptoms for approximately 2 years.  Gradually she has had some increased congestion mainly on the left side.  She recently saw Dr. Wheeler and on endoscopy had increased polyp growth on the left side only.  Prednisone was prescribed as well as Medrol and mupirocin.  She did not start any of these therapies.  She has had side effects to oral steroids in the past back in 2021 prior to her sinus surgery.  It caused an anxiety attack.  This was while taking a Medrol Dosepak.    She has read up on Dupixent and has some concerns about flares of HSV related to Dupixent.  She is also wondering if she has allergies and has not had testing.      Past Medical History:   Diagnosis Date    Gastroesophageal reflux disease without esophagitis 11/22/2022    Motion sickness      Family History   Problem Relation Age of Onset    Emphysema Maternal Grandfather     Mental Illness Paternal Grandfather     Prostate Cancer Paternal Grandfather     Colon Cancer Paternal Grandfather      Past Surgical History:   Procedure Laterality Date    ENDOSCOPIC POLYPECTOMY NASAL Bilateral 1/25/2022    Procedure: POLYPECTOMY, NASAL CAVITY, ENDOSCOPIC;  Surgeon: Jayna Boykin MD;  Location: [facility]    EXTRACTION(S) DENTAL      wisdom teeth       ENVIRONMENTAL HISTORY:   Pets inside the house include 1 dog(s).  Do you smoke cigarettes or other recreational drugs? No There is/are 0 smokers living in the house. The house does not have a damp basement.     SOCIAL HISTORY:   Harley is employed as . She lives with her spouse.      Review  of Systems      Current Outpatient Medications:     desogestrel-ethinyl estradiol (APRI) 0.15-30 MG-MCG tablet, Take 1 tablet by mouth daily, Disp: 84 tablet, Rfl: 4    fluticasone (FLONASE) 50 MCG/ACT nasal spray, Spray 1 spray into both nostrils daily, Disp: 18.2 mL, Rfl: 3    loratadine (CLARITIN) 10 MG tablet, Take 10 mg by mouth daily, Disp: , Rfl:     LORazepam (ATIVAN) 0.5 MG tablet, Take 1 tablet (0.5 mg) by mouth every 6 hours as needed for anxiety, Disp: 15 tablet, Rfl: 0    methylPREDNISolone (MEDROL DOSEPAK) 4 MG tablet therapy pack, Follow Package Directions, Disp: 21 tablet, Rfl: 0    mupirocin (BACTROBAN) 2 % external ointment, Apply a pea sized amount to inside of each nostril 3x daily with Qtip for 10 days, Disp: 22 g, Rfl: 0    predniSONE (DELTASONE) 10 MG tablet, Take 2 tabs daily x 7 days, then 1 tab daily x 7 days, Disp: 21 tablet, Rfl: 0    sertraline (ZOLOFT) 25 MG tablet, TAKE 1 TABLET BY MOUTH EVERY DAY, Disp: 30 tablet, Rfl: 5    valACYclovir (VALTREX) 500 MG tablet, Take 1 tablet (500 mg) by mouth 2 times daily FOR 3 DAYS, Disp: 6 tablet, Rfl: 1    ketoconazole (NIZORAL) 2 % external shampoo, Apply topically daily To arms, abdomen, legs (Patient not taking: Reported on 12/12/2023), Disp: 360 mL, Rfl: 3  Allergies   Allergen Reactions    Amoxicillin Unknown    Nitrofurantoin Rash     Very small area of arm    Other Environmental Allergy Other (See Comments)     Stuffy nose,itchy eyes    Penicillins Rash         EXAM:   BP (!) 136/90   Pulse 92   Wt 55.8 kg (123 lb)   SpO2 96%   BMI 19.08 kg/m      Physical Exam    Constitutional:       General: She is not in acute distress.     Appearance: Normal appearance. She is not ill-appearing.   HENT:      Head: Normocephalic and atraumatic.      Nose: She has a mild left inferior turbinate hypertrophy.  Septal deviation slightly to the left.  No obvious polyps identified anteriorly.  Right side is normal-appearing     mouth/Throat:      Mouth:  Mucous membranes are moist.      Pharynx: Oropharynx is clear. No posterior oropharyngeal erythema.   Eyes:      General:         Right eye: No discharge.         Left eye: No discharge.   Cardiovascular:      Rate and Rhythm: Normal rate and regular rhythm.      Heart sounds: Normal heart sounds.   Pulmonary:      Effort: Pulmonary effort is normal.      Breath sounds: Normal breath sounds. No wheezing or rhonchi.   Skin:     General: Skin is warm.      Findings: No erythema or rash.   Neurological:      General: No focal deficit present.      Mental Status: She is alert. Mental status is at baseline.   Psychiatric:         Mood and Affect: Mood normal.         Behavior: Behavior normal.      ASSESSMENT/PLAN:  Harley Quarles is a 33 year old female seen today for history of nasal polyps with increased nasal polyps and left-sided nasal congestion.  She does still have intact smell and taste.  She is here to discuss Dupixent.  She is hesitant to start this at this time.  She is willing to consider low-dose prednisone for 2 weeks to see if it results in improvement in her symptoms.  She is on Zoloft now and was not on Zoloft when she last had her oral steroids.  We discussed allergy shots, Dupixent, surgery, and oral steroids.  She would like to move forward with the oral steroids to see if she does get improvement.  Will follow-up in 2 months time.  Will also get blood test as ordered.    Check labs for allergies and eosinophil count  Prednisone 20 mg x 7 day, then 10 mg daily x 7 days  Will consider allergy shots  Will consider Dupixent every 2 weeks  Follow up in 2 months  Continue Zyrtec 10 mg at night  Flonase to continue -- (Xhance or budesonide rinses may be an option)    Follow-up in 2 months      Thank you for allowing me to participate in the care of Harley Quarles.      I spent 40 minutes on the date of the encounter doing chart review, history and exam, documentation and further coordination as noted above  exclusive of separately reported interpretations    Garrett Thompson MD  Allergy/Immunology  [facility]

## 2024-04-25 NOTE — LETTER
4/25/2024         RE: Harley Quarles  [address] [address]        Dear Colleague,    Thank you for referring your patient, Harley Quarles, to the [facility] [place]. Please see a copy of my visit note below.    Harley Quarles was seen in the Allergy Clinic at [facility].    Harley Quarles is a 33 year old female being seen today at the request of Joe Wheeler MD/[facility] in consultation for nasal polyposis.    She has a history of nasal polyps.  She had a sinus surgery January 2022.  CT scan in 2021 showed extensive polyp disease.  She had good relief of symptoms for approximately 2 years.  Gradually she has had some increased congestion mainly on the left side.  She recently saw Dr. Wheeler and on endoscopy had increased polyp growth on the left side only.  Prednisone was prescribed as well as Medrol and mupirocin.  She did not start any of these therapies.  She has had side effects to oral steroids in the past back in 2021 prior to her sinus surgery.  It caused an anxiety attack.  This was while taking a Medrol Dosepak.    She has read up on Dupixent and has some concerns about flares of HSV related to Dupixent.  She is also wondering if she has allergies and has not had testing.      Past Medical History:   Diagnosis Date     Gastroesophageal reflux disease without esophagitis 11/22/2022     Motion sickness      Family History   Problem Relation Age of Onset     Emphysema Maternal Grandfather      Mental Illness Paternal Grandfather      Prostate Cancer Paternal Grandfather      Colon Cancer Paternal Grandfather      Past Surgical History:   Procedure Laterality Date     ENDOSCOPIC POLYPECTOMY NASAL Bilateral 1/25/2022    Procedure: POLYPECTOMY, NASAL CAVITY, ENDOSCOPIC;  Surgeon: Jayna Boykin MD;  Location: [facility]     EXTRACTION(S) DENTAL      wisdom teeth       ENVIRONMENTAL HISTORY:   Pets inside the  house include 1 dog(s).  Do you smoke cigarettes or other recreational drugs? No There is/are 0 smokers living in the house. The house does not have a damp basement.     SOCIAL HISTORY:   Harley is employed as . She lives with her spouse.      Review of Systems      Current Outpatient Medications:      desogestrel-ethinyl estradiol (APRI) 0.15-30 MG-MCG tablet, Take 1 tablet by mouth daily, Disp: 84 tablet, Rfl: 4     fluticasone (FLONASE) 50 MCG/ACT nasal spray, Spray 1 spray into both nostrils daily, Disp: 18.2 mL, Rfl: 3     loratadine (CLARITIN) 10 MG tablet, Take 10 mg by mouth daily, Disp: , Rfl:      LORazepam (ATIVAN) 0.5 MG tablet, Take 1 tablet (0.5 mg) by mouth every 6 hours as needed for anxiety, Disp: 15 tablet, Rfl: 0     methylPREDNISolone (MEDROL DOSEPAK) 4 MG tablet therapy pack, Follow Package Directions, Disp: 21 tablet, Rfl: 0     mupirocin (BACTROBAN) 2 % external ointment, Apply a pea sized amount to inside of each nostril 3x daily with Qtip for 10 days, Disp: 22 g, Rfl: 0     predniSONE (DELTASONE) 10 MG tablet, Take 2 tabs daily x 7 days, then 1 tab daily x 7 days, Disp: 21 tablet, Rfl: 0     sertraline (ZOLOFT) 25 MG tablet, TAKE 1 TABLET BY MOUTH EVERY DAY, Disp: 30 tablet, Rfl: 5     valACYclovir (VALTREX) 500 MG tablet, Take 1 tablet (500 mg) by mouth 2 times daily FOR 3 DAYS, Disp: 6 tablet, Rfl: 1     ketoconazole (NIZORAL) 2 % external shampoo, Apply topically daily To arms, abdomen, legs (Patient not taking: Reported on 12/12/2023), Disp: 360 mL, Rfl: 3  Allergies   Allergen Reactions     Amoxicillin Unknown     Nitrofurantoin Rash     Very small area of arm     Other Environmental Allergy Other (See Comments)     Stuffy nose,itchy eyes     Penicillins Rash         EXAM:   BP (!) 136/90   Pulse 92   Wt 55.8 kg (123 lb)   SpO2 96%   BMI 19.08 kg/m      Physical Exam    Constitutional:       General: She is not in acute distress.     Appearance: Normal appearance. She is  not ill-appearing.   HENT:      Head: Normocephalic and atraumatic.      Nose: She has a mild left inferior turbinate hypertrophy.  Septal deviation slightly to the left.  No obvious polyps identified anteriorly.  Right side is normal-appearing     mouth/Throat:      Mouth: Mucous membranes are moist.      Pharynx: Oropharynx is clear. No posterior oropharyngeal erythema.   Eyes:      General:         Right eye: No discharge.         Left eye: No discharge.   Cardiovascular:      Rate and Rhythm: Normal rate and regular rhythm.      Heart sounds: Normal heart sounds.   Pulmonary:      Effort: Pulmonary effort is normal.      Breath sounds: Normal breath sounds. No wheezing or rhonchi.   Skin:     General: Skin is warm.      Findings: No erythema or rash.   Neurological:      General: No focal deficit present.      Mental Status: She is alert. Mental status is at baseline.   Psychiatric:         Mood and Affect: Mood normal.         Behavior: Behavior normal.      ASSESSMENT/PLAN:  Harley Quarles is a 33 year old female seen today for history of nasal polyps with increased nasal polyps and left-sided nasal congestion.  She does still have intact smell and taste.  She is here to discuss Dupixent.  She is hesitant to start this at this time.  She is willing to consider low-dose prednisone for 2 weeks to see if it results in improvement in her symptoms.  She is on Zoloft now and was not on Zoloft when she last had her oral steroids.  We discussed allergy shots, Dupixent, surgery, and oral steroids.  She would like to move forward with the oral steroids to see if she does get improvement.  Will follow-up in 2 months time.  Will also get blood test as ordered.    Check labs for allergies and eosinophil count  Prednisone 20 mg x 7 day, then 10 mg daily x 7 days  Will consider allergy shots  Will consider Dupixent every 2 weeks  Follow up in 2 months  Continue Zyrtec 10 mg at night  Flonase to continue -- (Xhance or  budesonide rinses may be an option)    Follow-up in 2 months      Thank you for allowing me to participate in the care of Harley Qualres.      I spent 40 minutes on the date of the encounter doing chart review, history and exam, documentation and further coordination as noted above exclusive of separately reported interpretations    Garrett Thompson MD  Allergy/Immunology  [facility]      Again, thank you for allowing me to participate in the care of your patient.        Sincerely,        Garrett Thompson MD

## 2024-04-25 NOTE — PATIENT INSTRUCTIONS
Check labs for allergies  Prednisone 20 mg x 7 day, then 10 mg daily x 7 days  Will consider allergy shots  Will consider Dupixent every 2 weeks  Follow up in 2 months  Continue Zyrtec 10 mg at night  Flonase to continue -- (Xhance or budesonide rinses may be an option)

## 2024-04-26 ENCOUNTER — LAB (OUTPATIENT)
Dept: LAB | Facility: CLINIC | Age: 34
End: 2024-04-26
Payer: COMMERCIAL

## 2024-04-26 DIAGNOSIS — J33.9 NASAL POLYPOSIS: ICD-10-CM

## 2024-04-26 LAB
BASOPHILS # BLD AUTO: 0 10E3/UL (ref 0–0.2)
BASOPHILS NFR BLD AUTO: 1 %
EOSINOPHIL # BLD AUTO: 0.3 10E3/UL (ref 0–0.7)
EOSINOPHIL NFR BLD AUTO: 8 %
ERYTHROCYTE [DISTWIDTH] IN BLOOD BY AUTOMATED COUNT: 12.7 % (ref 10–15)
HCT VFR BLD AUTO: 41.3 % (ref 35–47)
HGB BLD-MCNC: 13 G/DL (ref 11.7–15.7)
IMM GRANULOCYTES # BLD: 0 10E3/UL
IMM GRANULOCYTES NFR BLD: 0 %
LYMPHOCYTES # BLD AUTO: 1.9 10E3/UL (ref 0.8–5.3)
LYMPHOCYTES NFR BLD AUTO: 45 %
MCH RBC QN AUTO: 29 PG (ref 26.5–33)
MCHC RBC AUTO-ENTMCNC: 31.5 G/DL (ref 31.5–36.5)
MCV RBC AUTO: 92 FL (ref 78–100)
MONOCYTES # BLD AUTO: 0.4 10E3/UL (ref 0–1.3)
MONOCYTES NFR BLD AUTO: 10 %
NEUTROPHILS # BLD AUTO: 1.6 10E3/UL (ref 1.6–8.3)
NEUTROPHILS NFR BLD AUTO: 37 %
PLATELET # BLD AUTO: 230 10E3/UL (ref 150–450)
RBC # BLD AUTO: 4.49 10E6/UL (ref 3.8–5.2)
WBC # BLD AUTO: 4.3 10E3/UL (ref 4–11)

## 2024-04-26 PROCEDURE — 82785 ASSAY OF IGE: CPT

## 2024-04-26 PROCEDURE — 99207 ALLERGEN WHITE PINE IGE: CPT

## 2024-04-26 PROCEDURE — 36415 COLL VENOUS BLD VENIPUNCTURE: CPT

## 2024-04-26 PROCEDURE — 85025 COMPLETE CBC W/AUTO DIFF WBC: CPT

## 2024-04-26 PROCEDURE — 86003 ALLG SPEC IGE CRUDE XTRC EA: CPT

## 2024-04-29 LAB
COMMON RAGWEED IGE QN: <0.1 KU(A)/L
D PTERONYSS IGE QN: <0.1 KU(A)/L
DOG DANDER+EPITH IGE QN: <0.1 KU(A)/L
E PURPURASCENS IGE QN: <0.1 KU(A)/L
ENGL PLANTAIN IGE QN: <0.1 KU(A)/L
FIREBUSH IGE QN: <0.1 KU(A)/L
GIANT RAGWEED IGE QN: <0.1 KU(A)/L
GOOSEFOOT IGE QN: <0.1 KU(A)/L
JOHNSON GRASS IGE QN: <0.1 KU(A)/L
MAPLE IGE QN: <0.1 KU(A)/L
MUGWORT IGE QN: <0.1 KU(A)/L
P NOTATUM IGE QN: <0.1 KU(A)/L
RED MULBERRY IGE QN: <0.1 KU(A)/L
WHITE ELM IGE QN: <0.1 KU(A)/L
WHITE OAK IGE QN: <0.1 KU(A)/L
WORMWOOD IGE QN: <0.1 KU(A)/L

## 2024-04-30 LAB — IGE SERPL-ACNC: 27 KU/L (ref 0–114)

## 2024-05-01 LAB
A ALTERNATA IGE QN: <0.1 KU(A)/L
A FUMIGATUS IGE QN: <0.1 KU(A)/L
C HERBARUM IGE QN: <0.1 KU(A)/L
CALIF WALNUT POLN IGE QN: <0.1 KU(A)/L
CAT DANDER IGG QN: <0.1 KU(A)/L
CEDAR IGE QN: <0.1 KU(A)/L
COTTONWOOD IGE QN: <0.1 KU(A)/L
D FARINAE IGE QN: <0.1 KU(A)/L
EAST WHITE PINE IGE QN: <0.1 KU(A)/L
NETTLE IGE QN: <0.1 KU(A)/L
SALTWORT IGE QN: <0.1 KU(A)/L
SHEEP SORREL IGE QN: <0.1 KU(A)/L
SILVER BIRCH IGE QN: <0.1 KU(A)/L
TIMOTHY IGE QN: <0.1 KU(A)/L
WHITE ASH IGE QN: <0.1 KU(A)/L
WHITE MULBERRY IGE QN: <0.1 KU(A)/L

## 2024-06-16 DIAGNOSIS — B00.9 HERPES SIMPLEX VIRUS INFECTION: ICD-10-CM

## 2024-06-17 RX ORDER — VALACYCLOVIR HYDROCHLORIDE 500 MG/1
500 TABLET, FILM COATED ORAL 2 TIMES DAILY
Qty: 6 TABLET | Refills: 1 | Status: SHIPPED | OUTPATIENT
Start: 2024-06-17

## 2024-07-29 DIAGNOSIS — J30.9 ALLERGIC RHINITIS, UNSPECIFIED SEASONALITY, UNSPECIFIED TRIGGER: ICD-10-CM

## 2024-07-29 RX ORDER — FLUTICASONE PROPIONATE 50 MCG
1 SPRAY, SUSPENSION (ML) NASAL DAILY
Qty: 16 ML | Refills: 0 | Status: SHIPPED | OUTPATIENT
Start: 2024-07-29 | End: 2024-09-04

## 2024-08-05 ENCOUNTER — OFFICE VISIT (OUTPATIENT)
Dept: ALLERGY | Facility: CLINIC | Age: 34
End: 2024-08-05
Attending: INTERNAL MEDICINE
Payer: COMMERCIAL

## 2024-08-05 VITALS
DIASTOLIC BLOOD PRESSURE: 59 MMHG | BODY MASS INDEX: 19.2 KG/M2 | WEIGHT: 123.8 LBS | SYSTOLIC BLOOD PRESSURE: 108 MMHG | HEART RATE: 69 BPM | OXYGEN SATURATION: 98 %

## 2024-08-05 DIAGNOSIS — J33.9 NASAL POLYPOSIS: ICD-10-CM

## 2024-08-05 PROCEDURE — 99214 OFFICE O/P EST MOD 30 MIN: CPT | Performed by: INTERNAL MEDICINE

## 2024-08-05 RX ORDER — PREDNISONE 10 MG/1
TABLET ORAL
Qty: 42 TABLET | Refills: 0 | Status: SHIPPED | OUTPATIENT
Start: 2024-08-05

## 2024-08-05 RX ORDER — BUDESONIDE 0.5 MG/2ML
0.5 INHALANT ORAL DAILY
Qty: 60 ML | Refills: 4 | Status: SHIPPED | OUTPATIENT
Start: 2024-08-05

## 2024-08-05 NOTE — PATIENT INSTRUCTIONS
Start prednisone 30 mg for 7 days followed by a taper down to 20 mg for 7 days then 10 mg for 7 days.  If symptoms significantly improve early on, may stop the prednisone taper early.  The budesonide rinses would be available for as needed use after the steroid rinses whenever you get increased congestion on the left nasal passage.  Dupixent would be the next consideration if not improving with oral steroids or nasal rinses with steroids  Follow-up in 6 months    BUDESONIDE IRRIGATION INSTRUCTIONS     You will be starting Budesonide nasal irrigations and will need to obtain the following:       - NeilMed Sinus Rinse 8 oz Kit  - Distilled or filtered water   - Normal saline salt packets  - Budesonide medication      Place filtered or distilled water into the NeilMed bottle up to the fill line (DO NOT USE TAP OR WELL WATER).  Place the pre-made salt packet in the 8 oz of saline.  Then placed the budesonide medication into the bottle.  Shake the bottle to suspend into solution.  Lean head forward over a sink or a basin.  Rinse each side of the nose with one-half of the bottle (each squeeze is about one-half of the bottle). Rinse the nose twice daily.         Video example: [email or web address]/watch?[email or web address]

## 2024-08-05 NOTE — LETTER
8/5/2024      Harley Quarles  [address] [address]      Dear Colleague,    Thank you for referring your patient, Harley Quarles, to the [facility] [place]. Please see a copy of my visit note below.    Harley Quarles was seen in the Allergy Clinic at [facility].    Harley Quarles is a 33 year old female being seen today for ongoing evaluation of Nasal congestion +1 more    Since the last visit the patient has been feeling better.     At the last appointment in April she was given prednisone which she did decide to take a 20 mg for 7 days followed by 10 mg for 7 days and did not have any difficulty.  She now is on Zoloft which may provide some benefit in preventing side effects to the prednisone.  She had improvement for at least 3 months after this prednisone course.  Symptoms have started to return over the last month.  She has some minimal decrease sense of smell recently.  At the last appointment we did check eosinophil counts which were 300 and her allergy testing was negative via blood testing.    PAST ALLERGY HISTORY:    She has a history of nasal polyps.  She had a sinus surgery January 2022.  CT scan in 2021 showed extensive polyp disease.  She had good relief of symptoms for approximately 2 years.  Gradually she has had some increased congestion mainly on the left side.  Dr. Wheeler noticed increased polyp growth on the left side only.  She has had side effects to oral steroids in the past back in 2021 prior to her sinus surgery.  It caused an anxiety attack.     She has read up on Dupixent and has some concerns about flares of HSV related to Dupixent.      Past Medical History:   Diagnosis Date     Gastroesophageal reflux disease without esophagitis 11/22/2022     Motion sickness      Family History   Problem Relation Age of Onset     Emphysema Maternal Grandfather      Mental Illness Paternal Grandfather      Prostate Cancer Paternal Grandfather       Colon Cancer Paternal Grandfather      Past Surgical History:   Procedure Laterality Date     ENDOSCOPIC POLYPECTOMY NASAL Bilateral 1/25/2022    Procedure: POLYPECTOMY, NASAL CAVITY, ENDOSCOPIC;  Surgeon: Jayna Boykin MD;  Location: [facility]     EXTRACTION(S) DENTAL      wisdom teeth         Current Outpatient Medications:      budesonide (PULMICORT) 0.5 MG/2ML neb solution, Take 2 mLs (0.5 mg) by nebulization daily Use in the nasal rinse bottle as instructed daily as needed, Disp: 60 mL, Rfl: 4     desogestrel-ethinyl estradiol (APRI) 0.15-30 MG-MCG tablet, Take 1 tablet by mouth daily, Disp: 84 tablet, Rfl: 4     fluticasone (FLONASE) 50 MCG/ACT nasal spray, INSTILL 1 SPRAY INTO BOTH NOSTRILS DAILY, Disp: 16 mL, Rfl: 0     loratadine (CLARITIN) 10 MG tablet, Take 10 mg by mouth daily, Disp: , Rfl:      LORazepam (ATIVAN) 0.5 MG tablet, Take 1 tablet (0.5 mg) by mouth every 6 hours as needed for anxiety, Disp: 15 tablet, Rfl: 0     methylPREDNISolone (MEDROL DOSEPAK) 4 MG tablet therapy pack, Follow Package Directions, Disp: 21 tablet, Rfl: 0     mupirocin (BACTROBAN) 2 % external ointment, Apply a pea sized amount to inside of each nostril 3x daily with Qtip for 10 days, Disp: 22 g, Rfl: 0     predniSONE (DELTASONE) 10 MG tablet, Take 3 tabs daily x 7 days, then 2 tabs daily x 7 days, then 1 tab daily x 7 days, Disp: 42 tablet, Rfl: 0     sertraline (ZOLOFT) 25 MG tablet, TAKE 1 TABLET BY MOUTH EVERY DAY, Disp: 30 tablet, Rfl: 5     valACYclovir (VALTREX) 500 MG tablet, TAKE 1 TABLET BY MOUTH 2 TIMES DAILY FOR 3 DAYS, Disp: 6 tablet, Rfl: 1     ketoconazole (NIZORAL) 2 % external shampoo, Apply topically daily To arms, abdomen, legs (Patient not taking: Reported on 12/12/2023), Disp: 360 mL, Rfl: 3     predniSONE (DELTASONE) 10 MG tablet, Take 2 tabs daily x 7 days, then 1 tab daily x 7 days (Patient not taking: Reported on 8/5/2024), Disp: 21 tablet, Rfl: 0  Allergies   Allergen Reactions      Amoxicillin Unknown     Nitrofurantoin Rash     Very small area of arm     Other Environmental Allergy Other (See Comments)     Stuffy nose,itchy eyes     Penicillins Rash         EXAM:   /59   Pulse 69   Wt 56.2 kg (123 lb 12.8 oz)   SpO2 98%   BMI 19.20 kg/m      Constitutional:       General: She is not in acute distress.     Appearance: Normal appearance. She is not ill-appearing.   HENT:      Head: Normocephalic and atraumatic.      Nose: Mild intermittent hypertrophy bilaterally.  No evidence of polyps     Mouth/Throat:      Mouth: Mucous membranes are moist.      Pharynx: Oropharynx is clear. No posterior oropharyngeal erythema.   Eyes:      General:         Right eye: No discharge.         Left eye: No discharge.   Cardiovascular:      Rate and Rhythm: Normal rate and regular rhythm.      Heart sounds: Normal heart sounds.   Pulmonary:      Effort: Pulmonary effort is normal.      Breath sounds: Normal breath sounds. No wheezing or rhonchi.   Skin:     General: Skin is warm.      Findings: No erythema or rash.   Neurological:      General: No focal deficit present.      Mental Status: She is alert. Mental status is at baseline.   Psychiatric:         Mood and Affect: Mood normal.         Behavior: Behavior normal.        ASSESSMENT/PLAN:  Harley Quarles is a 33 year old female seen today for ongoing evaluation of chronic rhinosinusitis with polyps and had surgery in 2022.  Starting to have increased symptoms on the left.  Had a good response to prednisone for 14 days in April.  After discussion we will go with a course of steroids which she can stop the taper if doing well early on in the prednisone course.  Also discussed budesonide rinses which she would like to try.  Dupixent would be the last resort.    Start prednisone 30 mg for 7 days followed by a taper down to 20 mg for 7 days then 10 mg for 7 days.  If symptoms significantly improve early on, may stop the prednisone taper early.  The  budesonide rinses would be available for as needed use after the steroid rinses whenever you get increased congestion on the left nasal passage.  Dupixent would be the next consideration if not improving with oral steroids or nasal rinses with steroids  Follow-up in 6 months    BUDESONIDE IRRIGATION INSTRUCTIONS     You will be starting Budesonide nasal irrigations and will need to obtain the following:       - NeilMed Sinus Rinse 8 oz Kit  - Distilled or filtered water   - Normal saline salt packets  - Budesonide medication      Place filtered or distilled water into the NeilMed bottle up to the fill line (DO NOT USE TAP OR WELL WATER).  Place the pre-made salt packet in the 8 oz of saline.  Then placed the budesonide medication into the bottle.  Shake the bottle to suspend into solution.  Lean head forward over a sink or a basin.  Rinse each side of the nose with one-half of the bottle (each squeeze is about one-half of the bottle). Rinse the nose twice daily.           Follow-up in 6 months      Thank you for allowing me to participate in the care of Harley Quarles.      I spent 35 minutes on the date of the encounter doing chart review, history and exam, documentation and further coordination as noted above exclusive of separately reported interpretations    Garrett Thompson MD  Allergy/Immunology  [facility]      Again, thank you for allowing me to participate in the care of your patient.        Sincerely,        Garrett Thompson MD

## 2024-08-05 NOTE — PROGRESS NOTES
Harley Quarles was seen in the Allergy Clinic at [facility].    Harley Quarles is a 33 year old female being seen today for ongoing evaluation of Nasal congestion +1 more    Since the last visit the patient has been feeling better.     At the last appointment in April she was given prednisone which she did decide to take a 20 mg for 7 days followed by 10 mg for 7 days and did not have any difficulty.  She now is on Zoloft which may provide some benefit in preventing side effects to the prednisone.  She had improvement for at least 3 months after this prednisone course.  Symptoms have started to return over the last month.  She has some minimal decrease sense of smell recently.  At the last appointment we did check eosinophil counts which were 300 and her allergy testing was negative via blood testing.    PAST ALLERGY HISTORY:    She has a history of nasal polyps.  She had a sinus surgery January 2022.  CT scan in 2021 showed extensive polyp disease.  She had good relief of symptoms for approximately 2 years.  Gradually she has had some increased congestion mainly on the left side.  Dr. Wheeler noticed increased polyp growth on the left side only.  She has had side effects to oral steroids in the past back in 2021 prior to her sinus surgery.  It caused an anxiety attack.     She has read up on Dupixent and has some concerns about flares of HSV related to Dupixent.      Past Medical History:   Diagnosis Date    Gastroesophageal reflux disease without esophagitis 11/22/2022    Motion sickness      Family History   Problem Relation Age of Onset    Emphysema Maternal Grandfather     Mental Illness Paternal Grandfather     Prostate Cancer Paternal Grandfather     Colon Cancer Paternal Grandfather      Past Surgical History:   Procedure Laterality Date    ENDOSCOPIC POLYPECTOMY NASAL Bilateral 1/25/2022    Procedure: POLYPECTOMY, NASAL CAVITY, ENDOSCOPIC;  Surgeon: Jayna Boykin MD;  Location:  [facility]    EXTRACTION(S) DENTAL      wisdom teeth         Current Outpatient Medications:     budesonide (PULMICORT) 0.5 MG/2ML neb solution, Take 2 mLs (0.5 mg) by nebulization daily Use in the nasal rinse bottle as instructed daily as needed, Disp: 60 mL, Rfl: 4    desogestrel-ethinyl estradiol (APRI) 0.15-30 MG-MCG tablet, Take 1 tablet by mouth daily, Disp: 84 tablet, Rfl: 4    fluticasone (FLONASE) 50 MCG/ACT nasal spray, INSTILL 1 SPRAY INTO BOTH NOSTRILS DAILY, Disp: 16 mL, Rfl: 0    loratadine (CLARITIN) 10 MG tablet, Take 10 mg by mouth daily, Disp: , Rfl:     LORazepam (ATIVAN) 0.5 MG tablet, Take 1 tablet (0.5 mg) by mouth every 6 hours as needed for anxiety, Disp: 15 tablet, Rfl: 0    methylPREDNISolone (MEDROL DOSEPAK) 4 MG tablet therapy pack, Follow Package Directions, Disp: 21 tablet, Rfl: 0    mupirocin (BACTROBAN) 2 % external ointment, Apply a pea sized amount to inside of each nostril 3x daily with Qtip for 10 days, Disp: 22 g, Rfl: 0    predniSONE (DELTASONE) 10 MG tablet, Take 3 tabs daily x 7 days, then 2 tabs daily x 7 days, then 1 tab daily x 7 days, Disp: 42 tablet, Rfl: 0    sertraline (ZOLOFT) 25 MG tablet, TAKE 1 TABLET BY MOUTH EVERY DAY, Disp: 30 tablet, Rfl: 5    valACYclovir (VALTREX) 500 MG tablet, TAKE 1 TABLET BY MOUTH 2 TIMES DAILY FOR 3 DAYS, Disp: 6 tablet, Rfl: 1    ketoconazole (NIZORAL) 2 % external shampoo, Apply topically daily To arms, abdomen, legs (Patient not taking: Reported on 12/12/2023), Disp: 360 mL, Rfl: 3    predniSONE (DELTASONE) 10 MG tablet, Take 2 tabs daily x 7 days, then 1 tab daily x 7 days (Patient not taking: Reported on 8/5/2024), Disp: 21 tablet, Rfl: 0  Allergies   Allergen Reactions    Amoxicillin Unknown    Nitrofurantoin Rash     Very small area of arm    Other Environmental Allergy Other (See Comments)     Stuffy nose,itchy eyes    Penicillins Rash         EXAM:   /59   Pulse 69   Wt 56.2 kg (123 lb 12.8 oz)   SpO2 98%   BMI  19.20 kg/m      Constitutional:       General: She is not in acute distress.     Appearance: Normal appearance. She is not ill-appearing.   HENT:      Head: Normocephalic and atraumatic.      Nose: Mild intermittent hypertrophy bilaterally.  No evidence of polyps     Mouth/Throat:      Mouth: Mucous membranes are moist.      Pharynx: Oropharynx is clear. No posterior oropharyngeal erythema.   Eyes:      General:         Right eye: No discharge.         Left eye: No discharge.   Cardiovascular:      Rate and Rhythm: Normal rate and regular rhythm.      Heart sounds: Normal heart sounds.   Pulmonary:      Effort: Pulmonary effort is normal.      Breath sounds: Normal breath sounds. No wheezing or rhonchi.   Skin:     General: Skin is warm.      Findings: No erythema or rash.   Neurological:      General: No focal deficit present.      Mental Status: She is alert. Mental status is at baseline.   Psychiatric:         Mood and Affect: Mood normal.         Behavior: Behavior normal.        ASSESSMENT/PLAN:  Harley Quarles is a 33 year old female seen today for ongoing evaluation of chronic rhinosinusitis with polyps and had surgery in 2022.  Starting to have increased symptoms on the left.  Had a good response to prednisone for 14 days in April.  After discussion we will go with a course of steroids which she can stop the taper if doing well early on in the prednisone course.  Also discussed budesonide rinses which she would like to try.  Dupixent would be the last resort.    Start prednisone 30 mg for 7 days followed by a taper down to 20 mg for 7 days then 10 mg for 7 days.  If symptoms significantly improve early on, may stop the prednisone taper early.  The budesonide rinses would be available for as needed use after the steroid rinses whenever you get increased congestion on the left nasal passage.  Dupixent would be the next consideration if not improving with oral steroids or nasal rinses with steroids  Follow-up in  6 months    BUDESONIDE IRRIGATION INSTRUCTIONS     You will be starting Budesonide nasal irrigations and will need to obtain the following:       - NeilMed Sinus Rinse 8 oz Kit  - Distilled or filtered water   - Normal saline salt packets  - Budesonide medication      Place filtered or distilled water into the NeilMed bottle up to the fill line (DO NOT USE TAP OR WELL WATER).  Place the pre-made salt packet in the 8 oz of saline.  Then placed the budesonide medication into the bottle.  Shake the bottle to suspend into solution.  Lean head forward over a sink or a basin.  Rinse each side of the nose with one-half of the bottle (each squeeze is about one-half of the bottle). Rinse the nose twice daily.           Follow-up in 6 months      Thank you for allowing me to participate in the care of Harley Quarles.      I spent 35 minutes on the date of the encounter doing chart review, history and exam, documentation and further coordination as noted above exclusive of separately reported interpretations    Garrett Thompson MD  Allergy/Immunology  [facility]

## 2024-08-08 ENCOUNTER — MYC REFILL (OUTPATIENT)
Dept: FAMILY MEDICINE | Facility: CLINIC | Age: 34
End: 2024-08-08
Payer: COMMERCIAL

## 2024-08-08 DIAGNOSIS — J30.9 ALLERGIC RHINITIS, UNSPECIFIED SEASONALITY, UNSPECIFIED TRIGGER: ICD-10-CM

## 2024-08-08 RX ORDER — FLUTICASONE PROPIONATE 50 MCG
1 SPRAY, SUSPENSION (ML) NASAL DAILY
Qty: 16 ML | Refills: 0 | OUTPATIENT
Start: 2024-08-08

## 2024-08-30 DIAGNOSIS — F41.1 GAD (GENERALIZED ANXIETY DISORDER): ICD-10-CM

## 2024-09-02 RX ORDER — SERTRALINE HYDROCHLORIDE 25 MG/1
TABLET, FILM COATED ORAL
Qty: 30 TABLET | Refills: 5 | Status: SHIPPED | OUTPATIENT
Start: 2024-09-02

## 2024-09-04 ENCOUNTER — MYC MEDICAL ADVICE (OUTPATIENT)
Dept: ALLERGY | Facility: CLINIC | Age: 34
End: 2024-09-04
Payer: COMMERCIAL

## 2024-09-04 DIAGNOSIS — J30.9 ALLERGIC RHINITIS, UNSPECIFIED SEASONALITY, UNSPECIFIED TRIGGER: ICD-10-CM

## 2024-09-04 RX ORDER — FLUTICASONE PROPIONATE 50 MCG
1 SPRAY, SUSPENSION (ML) NASAL DAILY
Qty: 16 ML | Refills: 1 | Status: SHIPPED | OUTPATIENT
Start: 2024-09-04

## 2024-10-16 DIAGNOSIS — J30.9 ALLERGIC RHINITIS, UNSPECIFIED SEASONALITY, UNSPECIFIED TRIGGER: ICD-10-CM

## 2024-10-16 NOTE — TELEPHONE ENCOUNTER
Requested Prescriptions   Pending Prescriptions Disp Refills    fluticasone (FLONASE) 50 MCG/ACT nasal spray 16 mL 1     Sig: Spray 1 spray into both nostrils daily.       There is no refill protocol information for this order        Last Written Prescription Date:  09/04/2024  Last Fill Quantity: 16 mL,  # refills: 1   Last office visit: 8/5/2024 ; last virtual visit: Visit date not found with prescribing provider:  Garrett Thompson MD   Future Office Visit:  02/03/2025    90 Day Prescription Request.

## 2024-10-17 RX ORDER — FLUTICASONE PROPIONATE 50 MCG
1 SPRAY, SUSPENSION (ML) NASAL DAILY
Qty: 16 ML | Refills: 1 | Status: SHIPPED | OUTPATIENT
Start: 2024-10-17

## 2024-10-17 NOTE — TELEPHONE ENCOUNTER
Requested Prescriptions   Pending Prescriptions Disp Refills    fluticasone (FLONASE) 50 MCG/ACT nasal spray 16 mL 1     Sig: Spray 1 spray into both nostrils daily.       Nasal Allergy Protocol Passed - 10/16/2024  4:05 PM        Passed - Patient is age 12 or older        Passed - Medication is active on med list        Passed - Recent (12 mo) or future (90 days) visit within the authorizing provider's specialty     The patient must have completed an in-person or virtual visit within the past 12 months or has a future visit scheduled within the next 90 days with the authorizing provider s specialty.  Urgent care and e-visits do not quality as an office visit for this protocol.          Passed - Medication indicated for associated diagnosis     Medication is associated with one or more of the following diagnoses:   Allergic conjunctivitis  Allergies  Nasal congestion  Nasal discharge  Rhinitis  Sinus congestion  Recurrent acute maxillary sinusitis  Environmental allergies   Acute sinusitis                 Last Written Prescription Date:  09/04/2024  Last Fill Quantity: 16 mL,  # refills: 1   Last office visit: 8/5/2024 ; last virtual visit: Visit date not found with prescribing provider:   Future Office Visit: 02/03/2025 with Dr. Thompson    RN filling per passed [facility] protocol.    Bessie Garces RN

## 2024-11-18 DIAGNOSIS — J30.9 ALLERGIC RHINITIS, UNSPECIFIED SEASONALITY, UNSPECIFIED TRIGGER: ICD-10-CM

## 2024-11-18 NOTE — TELEPHONE ENCOUNTER
90 Day prescription request.    Requested Prescriptions   Pending Prescriptions Disp Refills    fluticasone (FLONASE) 50 MCG/ACT nasal spray 16 mL 1     Sig: Spray 1 spray into both nostrils daily.       There is no refill protocol information for this order        Last Written Prescription Date:  10/17/2024  Last Fill Quantity: 16 mL,  # refills: 1   Last office visit: 8/5/2024 ; last virtual visit: Visit date not found with prescribing provider:  Garrett Thompson MD   Future Office Visit: 02/03/2025    Next 5 appointments (look out 90 days)      Jordan 15, 2025 4:30 PM  (Arrive by 4:10 PM)  Adult Preventative Visit with Ramandeep Woods MD  [facility] ([facility] ) [address], [address]  [facility] [address] [phone]

## 2024-11-19 RX ORDER — FLUTICASONE PROPIONATE 50 MCG
1 SPRAY, SUSPENSION (ML) NASAL DAILY
Qty: 48 ML | Refills: 2 | Status: SHIPPED | OUTPATIENT
Start: 2024-11-19

## 2024-11-19 NOTE — TELEPHONE ENCOUNTER
Requested Prescriptions   Pending Prescriptions Disp Refills    fluticasone (FLONASE) 50 MCG/ACT nasal spray 48 mL 2     Sig: Spray 1 spray into both nostrils daily.       Nasal Allergy Protocol Passed - 11/19/2024  8:37 AM        Passed - Patient is age 12 or older        Passed - Medication is active on med list        Passed - Recent (12 mo) or future (90 days) visit within the authorizing provider's specialty     The patient must have completed an in-person or virtual visit within the past 12 months or has a future visit scheduled within the next 90 days with the authorizing provider s specialty.  Urgent care and e-visits do not qualify as an office visit for this protocol.          Passed - Medication indicated for associated diagnosis     Medication is associated with one or more of the following diagnoses:   Allergic conjunctivitis  Allergies  Nasal congestion  Nasal discharge  Rhinitis  Sinus congestion  Recurrent acute maxillary sinusitis  Environmental allergies   Acute sinusitis   Cystic Fibrosis  Bronchiectasis           Last Written Prescription Date:  10/17/2024  Last Fill Quantity: 16 mL,  # refills: 1   Last office visit: 8/5/2024 ; last virtual visit: Visit date not found with prescribing provider:  Dr. Garrett Thompson   Future Office Visit: 02/03/2025    Next 5 appointments (look out 90 days)      Jordan 15, 2025 4:30 PM  (Arrive by 4:10 PM)  Adult Preventative Visit with Ramandeep Woods MD  [facility] ([facility] ) [address] [facility], [address]  [place] [id number] [phone]           Rx 90 day refill per patient request.    Bessie Garces RN

## 2025-01-05 DIAGNOSIS — Z30.40 ENCOUNTER FOR SURVEILLANCE OF CONTRACEPTIVES, UNSPECIFIED CONTRACEPTIVE: ICD-10-CM

## 2025-01-07 RX ORDER — DESOGESTREL AND ETHINYL ESTRADIOL 0.15-0.03
1 KIT ORAL DAILY
Qty: 84 TABLET | Refills: 4 | Status: SHIPPED | OUTPATIENT
Start: 2025-01-07